# Patient Record
Sex: FEMALE | Race: WHITE | Employment: OTHER | URBAN - NONMETROPOLITAN AREA
[De-identification: names, ages, dates, MRNs, and addresses within clinical notes are randomized per-mention and may not be internally consistent; named-entity substitution may affect disease eponyms.]

---

## 2017-11-27 ENCOUNTER — HOSPITAL ENCOUNTER (OUTPATIENT)
Dept: BONE DENSITY | Age: 67
Discharge: HOME | End: 2017-11-27
Payer: MEDICARE

## 2017-11-27 ENCOUNTER — HOSPITAL ENCOUNTER (OUTPATIENT)
Dept: WOMENS IMAGING | Age: 67
Discharge: HOME | End: 2017-11-27
Payer: MEDICARE

## 2017-11-27 DIAGNOSIS — Z78.0 MENOPAUSE: ICD-10-CM

## 2017-11-27 DIAGNOSIS — Z12.31 VISIT FOR SCREENING MAMMOGRAM: ICD-10-CM

## 2017-11-27 PROCEDURE — 77080 DXA BONE DENSITY AXIAL: CPT

## 2017-11-27 PROCEDURE — G0202 SCR MAMMO BI INCL CAD: HCPCS

## 2019-03-26 ENCOUNTER — HOSPITAL ENCOUNTER (OUTPATIENT)
Dept: WOMENS IMAGING | Age: 69
Discharge: HOME | End: 2019-03-28
Payer: MEDICARE

## 2019-03-26 DIAGNOSIS — Z12.31 VISIT FOR SCREENING MAMMOGRAM: ICD-10-CM

## 2019-03-26 PROCEDURE — 77063 BREAST TOMOSYNTHESIS BI: CPT

## 2020-06-23 ENCOUNTER — OFFICE VISIT (OUTPATIENT)
Dept: PRIMARY CARE CLINIC | Age: 70
End: 2020-06-23
Payer: MEDICARE

## 2020-06-23 ENCOUNTER — HOSPITAL ENCOUNTER (OUTPATIENT)
Age: 70
Setting detail: SPECIMEN
Discharge: HOME | End: 2020-06-23
Payer: MEDICARE

## 2020-06-23 VITALS
DIASTOLIC BLOOD PRESSURE: 67 MMHG | WEIGHT: 170.8 LBS | RESPIRATION RATE: 20 BRPM | SYSTOLIC BLOOD PRESSURE: 118 MMHG | OXYGEN SATURATION: 98 % | BODY MASS INDEX: 25.22 KG/M2 | TEMPERATURE: 99.1 F | HEART RATE: 90 BPM

## 2020-06-23 LAB — S PYO AG THROAT QL: NORMAL

## 2020-06-23 PROCEDURE — 4040F PNEUMOC VAC/ADMIN/RCVD: CPT | Performed by: NURSE PRACTITIONER

## 2020-06-23 PROCEDURE — 3017F COLORECTAL CA SCREEN DOC REV: CPT | Performed by: NURSE PRACTITIONER

## 2020-06-23 PROCEDURE — G8417 CALC BMI ABV UP PARAM F/U: HCPCS | Performed by: NURSE PRACTITIONER

## 2020-06-23 PROCEDURE — 99213 OFFICE O/P EST LOW 20 MIN: CPT | Performed by: NURSE PRACTITIONER

## 2020-06-23 PROCEDURE — 1123F ACP DISCUSS/DSCN MKR DOCD: CPT | Performed by: NURSE PRACTITIONER

## 2020-06-23 PROCEDURE — G8427 DOCREV CUR MEDS BY ELIG CLIN: HCPCS | Performed by: NURSE PRACTITIONER

## 2020-06-23 PROCEDURE — G8399 PT W/DXA RESULTS DOCUMENT: HCPCS | Performed by: NURSE PRACTITIONER

## 2020-06-23 PROCEDURE — 87880 STREP A ASSAY W/OPTIC: CPT | Performed by: NURSE PRACTITIONER

## 2020-06-23 PROCEDURE — U0003 INFECTIOUS AGENT DETECTION BY NUCLEIC ACID (DNA OR RNA); SEVERE ACUTE RESPIRATORY SYNDROME CORONAVIRUS 2 (SARS-COV-2) (CORONAVIRUS DISEASE [COVID-19]), AMPLIFIED PROBE TECHNIQUE, MAKING USE OF HIGH THROUGHPUT TECHNOLOGIES AS DESCRIBED BY CMS-2020-01-R: HCPCS

## 2020-06-23 PROCEDURE — 1036F TOBACCO NON-USER: CPT | Performed by: NURSE PRACTITIONER

## 2020-06-23 PROCEDURE — 1090F PRES/ABSN URINE INCON ASSESS: CPT | Performed by: NURSE PRACTITIONER

## 2020-06-23 RX ORDER — ONDANSETRON 4 MG/1
4 TABLET, FILM COATED ORAL EVERY 12 HOURS PRN
Qty: 15 TABLET | Refills: 0 | Status: SHIPPED | OUTPATIENT
Start: 2020-06-23 | End: 2020-08-17

## 2020-06-23 ASSESSMENT — ENCOUNTER SYMPTOMS
VOMITING: 0
COUGH: 0
NAUSEA: 1
DIARRHEA: 0
WHEEZING: 0
EYE DISCHARGE: 0
SINUS PRESSURE: 0
PHOTOPHOBIA: 0
SHORTNESS OF BREATH: 0
CONSTIPATION: 0
ABDOMINAL PAIN: 0
SORE THROAT: 0
BLOOD IN STOOL: 0

## 2020-06-23 NOTE — PATIENT INSTRUCTIONS
virus. Their websites contain the most up-to-date information. Nicole Fling also learn what to do if you think you may have been exposed to the virus. · U.S. Centers for Disease Control and Prevention (CDC): The CDC provides updated news about the disease and travel advice. The website also tells you how to prevent the spread of infection. www.cdc.gov  · World Health Organization [facility]): WHO offers information about the virus outbreaks. WHO also has travel advice. www.who.int  Current as of: May 8, 2020               Content Version: 12.5  © 2006-2020 Healthwise, Incorporated. Care instructions adapted under license by [facility] ([facility]). If you have questions about a medical condition or this instruction, always ask your healthcare professional. [address] any warranty or liability for your use of this information.

## 2020-06-23 NOTE — PROGRESS NOTES
[facility] WALK-IN [facility]  [address] [facility] [address] [facility]  Dept: [phone]  Dept Fax: [phone]    Cuco Jackson is a 78 y.o. female who [facility]sto the [facility] today for her medical conditions/complaints as noted below. Cuco Jackson is c/o of Fever (this AM, 101 @ noon) and Fatigue (today, body aches)      HPI:     Juan Mcgill is here today for a walk in visit. When she woke up this morning she had muscle aches and fatigue so she took her temperature and it was 97. She took a nap and when she woke up her temperature was 101 around noon. She denies any cough, shortness of breath, sore throat, dysuria, diarrhea or swelling in her legs. She does admit to a mild frontal headache and body aches. Denies any photophobia or changes in vision. 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 She denies any sick contacts that she knows of. She denies any recent travel. See below for further details    Fever    This is a new problem. The current episode started today. The problem has been unchanged. The maximum temperature noted was 101 to 101.9 F (101). The temperature was taken using an oral thermometer. Associated symptoms include headaches (frontal headache), muscle aches, nausea and sleepiness. Pertinent negatives include no abdominal pain, chest pain, congestion, coughing, diarrhea, ear pain, rash, sore throat, urinary pain, vomiting or wheezing. Associated symptoms comments: Decreased appetite. She has tried nothing for the symptoms. Risk factors: no contaminated food, no contaminated water, no hx of cancer, no immunosuppression, no recent sickness, no recent travel and no sick contacts    Fatigue   This is a new problem. The current episode started today. The problem has been unchanged. Associated symptoms include fatigue, a fever, headaches (frontal headache), myalgias and nausea.  Pertinent negatives include no abdominal pain, chest pain, worsen or fail to improve. Orders Placed This Encounter   Medications    ondansetron (ZOFRAN) 4 MG tablet     Sig: Take 1 tablet by mouth every 12 hours as needed for Nausea or Vomiting     Dispense:  15 tablet     Refill:  0          All patient questions answered. Pt voiced understanding.       Electronically signed by SEVERO Lugo CNP on 6/23/2020 at 3:06 PM

## 2020-06-24 ENCOUNTER — CARE COORDINATION (OUTPATIENT)
Dept: CARE COORDINATION | Age: 70
End: 2020-06-24

## 2020-06-24 NOTE — CARE COORDINATION
medications related to discharge diagnosis     Patient/family/caregiver given information for GetWell Loop and agrees to enroll yes  Based on Loop alert triggers, patient will be contacted by nurse care manager for worsening symptoms. Pt will be further monitored by COVID Loop Team based on severity of symptoms and risk factors. Cecy Fox stated that she was feeling a little better this morning. She took some tylenol which helped her fever, headache, and aches. She had some nausea and said the zofran helped. She is now having diarrhea. 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 Encouraged fluids as tolerated and rest. Encouraged daily check ins on the Loop.

## 2020-06-25 ENCOUNTER — CARE COORDINATION (OUTPATIENT)
Dept: CARE COORDINATION | Age: 70
End: 2020-06-25

## 2020-06-25 NOTE — CARE COORDINATION
Yellow alert noted in Loop remote symptom monitoring program. Messaged patient to notify Marcin Sanders if symptoms have worsened since yesterday. Ashtyn Benton, Good Morning and thank you for checking in with us this morning. Please let us know if any of your symptoms are worse than yesterday, or if you would like to speak with a nurse. We are available 8-4 M-F, and 9-1 S/S. Thank You- Tena ESPINO    Patient responded back:  \"I still have diarrhea, that hasn't improved at all. My throat feels weird, like I have stuff in it and I am coughing a little bit this morning. Thank you, Sr. Frazier\"     Message sent to patient:   Be sure to drink plenty of fluids, Gatorade, pedialyte to prevent dehydration. Try drinking some hot tea, with honey and lemon to help your throat. You could also try gargling with warm salt water to help decrease the secretions in your throat.  Tena ESPINO

## 2020-06-26 ENCOUNTER — TELEPHONE (OUTPATIENT)
Dept: PRIMARY CARE CLINIC | Age: 70
End: 2020-06-26

## 2020-06-26 ENCOUNTER — CARE COORDINATION (OUTPATIENT)
Dept: CARE COORDINATION | Age: 70
End: 2020-06-26

## 2020-06-26 LAB — SARS-COV-2, NAA: NOT DETECTED

## 2020-06-26 NOTE — CARE COORDINATION
RN received message through loop from patient:  Naeem Galdamez, 11:09 AM   My test came back negative for covid and I am feeling much better. Do I need to be retested for the virus as someone suggested? Thank you for replying    RN response:  Lindsey Christian RN, 11:16 AM   Naeem Galdamez, I am happy to hear that you are feeling better. Unless you need some sort of documentation for an employer or someone, you should not need to be retested. If your symptoms would start to worsen again we would recommend following up with your primary care physician for recommendations.  Shantell Gonzalez RN

## 2020-06-26 NOTE — TELEPHONE ENCOUNTER
----- Message from SEVERO Ray CNP sent at 6/26/2020  8:10 AM EDT -----  Please notify patient that their COVID testing is negative meaning the virus was not detected.

## 2020-07-16 ENCOUNTER — HOSPITAL ENCOUNTER (OUTPATIENT)
Dept: LAB | Age: 70
Discharge: HOME | End: 2020-07-16
Payer: MEDICARE

## 2020-07-16 LAB
ALT SERPL-CCNC: 22 U/L (ref 5–33)
ANION GAP SERPL CALCULATED.3IONS-SCNC: 9 MMOL/L (ref 9–17)
AST SERPL-CCNC: 26 U/L
BUN BLDV-MCNC: 25 MG/DL (ref 8–23)
BUN/CREAT BLD: 22 (ref 9–20)
CALCIUM SERPL-MCNC: 9.8 MG/DL (ref 8.6–10.4)
CHLORIDE BLD-SCNC: 101 MMOL/L (ref 98–107)
CHOLESTEROL/HDL RATIO: 3.2
CHOLESTEROL: 171 MG/DL
CO2: 31 MMOL/L (ref 20–31)
CREAT SERPL-MCNC: 1.16 MG/DL (ref 0.5–0.9)
GFR AFRICAN AMERICAN: 56 ML/MIN
GFR NON-AFRICAN AMERICAN: 46 ML/MIN
GFR SERPL CREATININE-BSD FRML MDRD: ABNORMAL ML/MIN/{1.73_M2}
GFR SERPL CREATININE-BSD FRML MDRD: ABNORMAL ML/MIN/{1.73_M2}
GLUCOSE BLD-MCNC: 82 MG/DL (ref 70–99)
HCT VFR BLD CALC: 43.7 % (ref 36.3–47.1)
HDLC SERPL-MCNC: 53 MG/DL
HEMOGLOBIN: 14.2 G/DL (ref 11.9–15.1)
LDL CHOLESTEROL: 94 MG/DL (ref 0–130)
MCH RBC QN AUTO: 29.6 PG (ref 25.2–33.5)
MCHC RBC AUTO-ENTMCNC: 32.5 G/DL (ref 28.4–34.8)
MCV RBC AUTO: 91.2 FL (ref 82.6–102.9)
NRBC AUTOMATED: 0 PER 100 WBC
PDW BLD-RTO: 12.1 % (ref 11.8–14.4)
PLATELET # BLD: 241 K/UL (ref 138–453)
PMV BLD AUTO: 11.2 FL (ref 8.1–13.5)
POTASSIUM SERPL-SCNC: 3.8 MMOL/L (ref 3.7–5.3)
RBC # BLD: 4.79 M/UL (ref 3.95–5.11)
SODIUM BLD-SCNC: 141 MMOL/L (ref 135–144)
TRIGL SERPL-MCNC: 119 MG/DL
TSH SERPL DL<=0.05 MIU/L-ACNC: 0.7 MIU/L (ref 0.3–5)
VLDLC SERPL CALC-MCNC: NORMAL MG/DL (ref 1–30)
WBC # BLD: 6 K/UL (ref 3.5–11.3)

## 2020-07-16 PROCEDURE — 84460 ALANINE AMINO (ALT) (SGPT): CPT

## 2020-07-16 PROCEDURE — 84443 ASSAY THYROID STIM HORMONE: CPT

## 2020-07-16 PROCEDURE — 84450 TRANSFERASE (AST) (SGOT): CPT

## 2020-07-16 PROCEDURE — 80048 BASIC METABOLIC PNL TOTAL CA: CPT

## 2020-07-16 PROCEDURE — 80061 LIPID PANEL: CPT

## 2020-07-16 PROCEDURE — 36415 COLL VENOUS BLD VENIPUNCTURE: CPT

## 2020-07-16 PROCEDURE — 85027 COMPLETE CBC AUTOMATED: CPT

## 2020-08-12 ENCOUNTER — HOSPITAL ENCOUNTER (OUTPATIENT)
Dept: WOMENS IMAGING | Age: 70
Discharge: HOME | End: 2020-08-14
Payer: MEDICARE

## 2020-08-12 PROCEDURE — 77067 SCR MAMMO BI INCL CAD: CPT

## 2020-11-09 ENCOUNTER — HOSPITAL ENCOUNTER (OUTPATIENT)
Dept: LAB | Age: 70
Discharge: HOME | End: 2020-11-09
Payer: MEDICARE

## 2021-06-21 ENCOUNTER — APPOINTMENT (OUTPATIENT)
Dept: CT IMAGING | Age: 71
DRG: 343 | End: 2021-06-21
Payer: MEDICARE

## 2021-06-21 ENCOUNTER — HOSPITAL ENCOUNTER (INPATIENT)
Age: 71
LOS: 2 days | Discharge: HOME | DRG: 343 | End: 2021-06-23
Attending: EMERGENCY MEDICINE
Payer: MEDICARE

## 2021-06-21 DIAGNOSIS — E87.6 HYPOKALEMIA: ICD-10-CM

## 2021-06-21 DIAGNOSIS — Z90.49 S/P LAPAROSCOPIC APPENDECTOMY: ICD-10-CM

## 2021-06-21 DIAGNOSIS — K35.80 ACUTE APPENDICITIS, UNSPECIFIED ACUTE APPENDICITIS TYPE: Primary | ICD-10-CM

## 2021-06-21 DIAGNOSIS — R10.9 ABDOMINAL PAIN, UNSPECIFIED ABDOMINAL LOCATION: ICD-10-CM

## 2021-06-21 LAB
-: ABNORMAL
ABSOLUTE EOS #: <0.03 K/UL (ref 0–0.44)
ABSOLUTE IMMATURE GRANULOCYTE: 0.06 K/UL (ref 0–0.3)
ABSOLUTE LYMPH #: 0.97 K/UL (ref 1.1–3.7)
ABSOLUTE MONO #: 1.04 K/UL (ref 0.1–1.2)
ALBUMIN SERPL-MCNC: 4.3 G/DL (ref 3.5–5.2)
ALBUMIN/GLOBULIN RATIO: 1.8 (ref 1–2.5)
ALP BLD-CCNC: 75 U/L (ref 35–104)
ALT SERPL-CCNC: 25 U/L (ref 5–33)
AMORPHOUS: ABNORMAL
ANION GAP SERPL CALCULATED.3IONS-SCNC: 12 MMOL/L (ref 9–17)
AST SERPL-CCNC: 31 U/L
BACTERIA: ABNORMAL
BASOPHILS # BLD: 0 % (ref 0–2)
BASOPHILS ABSOLUTE: 0.06 K/UL (ref 0–0.2)
BILIRUB SERPL-MCNC: 1.22 MG/DL (ref 0.3–1.2)
BILIRUBIN URINE: NEGATIVE
BUN BLDV-MCNC: 24 MG/DL (ref 8–23)
BUN/CREAT BLD: 23 (ref 9–20)
CALCIUM SERPL-MCNC: 9 MG/DL (ref 8.6–10.4)
CASTS UA: ABNORMAL /LPF
CHLORIDE BLD-SCNC: 100 MMOL/L (ref 98–107)
CO2: 25 MMOL/L (ref 20–31)
COLOR: YELLOW
COMMENT UA: ABNORMAL
CREAT SERPL-MCNC: 1.03 MG/DL (ref 0.5–0.9)
CRYSTALS, UA: ABNORMAL /HPF
DIFFERENTIAL TYPE: ABNORMAL
EOSINOPHILS RELATIVE PERCENT: 0 % (ref 1–4)
EPITHELIAL CELLS UA: ABNORMAL /HPF (ref 0–25)
GFR AFRICAN AMERICAN: >60 ML/MIN
GFR NON-AFRICAN AMERICAN: 53 ML/MIN
GFR SERPL CREATININE-BSD FRML MDRD: ABNORMAL ML/MIN/{1.73_M2}
GFR SERPL CREATININE-BSD FRML MDRD: ABNORMAL ML/MIN/{1.73_M2}
GLUCOSE BLD-MCNC: 154 MG/DL (ref 70–99)
GLUCOSE URINE: NEGATIVE
HCT VFR BLD CALC: 42.4 % (ref 36.3–47.1)
HEMOGLOBIN: 14.4 G/DL (ref 11.9–15.1)
IMMATURE GRANULOCYTES: 0 %
KETONES, URINE: NEGATIVE
LACTIC ACID, WHOLE BLOOD: NORMAL MMOL/L (ref 0.7–2.1)
LACTIC ACID: 1.1 MMOL/L (ref 0.5–2.2)
LEUKOCYTE ESTERASE, URINE: ABNORMAL
LIPASE: 42 U/L (ref 13–60)
LYMPHOCYTES # BLD: 6 % (ref 24–43)
MAGNESIUM: 2.2 MG/DL (ref 1.6–2.6)
MCH RBC QN AUTO: 29.2 PG (ref 25.2–33.5)
MCHC RBC AUTO-ENTMCNC: 34 G/DL (ref 28.4–34.8)
MCV RBC AUTO: 86 FL (ref 82.6–102.9)
MONOCYTES # BLD: 7 % (ref 3–12)
MUCUS: ABNORMAL
NITRITE, URINE: NEGATIVE
NRBC AUTOMATED: 0 PER 100 WBC
OTHER OBSERVATIONS UA: ABNORMAL
PDW BLD-RTO: 12.5 % (ref 11.8–14.4)
PH UA: 5.5 (ref 5–9)
PLATELET # BLD: 208 K/UL (ref 138–453)
PLATELET ESTIMATE: ABNORMAL
PMV BLD AUTO: 10.7 FL (ref 8.1–13.5)
POTASSIUM SERPL-SCNC: 2.9 MMOL/L (ref 3.7–5.3)
PROTEIN UA: NEGATIVE
RBC # BLD: 4.93 M/UL (ref 3.95–5.11)
RBC # BLD: ABNORMAL 10*6/UL
RBC UA: ABNORMAL /HPF (ref 0–2)
RENAL EPITHELIAL, UA: ABNORMAL /HPF
SEG NEUTROPHILS: 87 % (ref 36–65)
SEGMENTED NEUTROPHILS ABSOLUTE COUNT: 13.01 K/UL (ref 1.5–8.1)
SODIUM BLD-SCNC: 137 MMOL/L (ref 135–144)
SPECIFIC GRAVITY UA: 1.01 (ref 1.01–1.02)
TOTAL PROTEIN: 6.7 G/DL (ref 6.4–8.3)
TRICHOMONAS: ABNORMAL
TURBIDITY: CLEAR
URINE HGB: NEGATIVE
UROBILINOGEN, URINE: NORMAL
WBC # BLD: 15.2 K/UL (ref 3.5–11.3)
WBC # BLD: ABNORMAL 10*3/UL
WBC UA: ABNORMAL /HPF (ref 0–5)
YEAST: ABNORMAL

## 2021-06-21 PROCEDURE — 99283 EMERGENCY DEPT VISIT LOW MDM: CPT

## 2021-06-21 PROCEDURE — 83605 ASSAY OF LACTIC ACID: CPT

## 2021-06-21 PROCEDURE — 81001 URINALYSIS AUTO W/SCOPE: CPT

## 2021-06-21 PROCEDURE — 2580000003 HC RX 258: Performed by: PHYSICIAN ASSISTANT

## 2021-06-21 PROCEDURE — 6360000002 HC RX W HCPCS: Performed by: PHYSICIAN ASSISTANT

## 2021-06-21 PROCEDURE — G1010 CDSM STANSON: HCPCS

## 2021-06-21 PROCEDURE — 1200000000 HC SEMI PRIVATE

## 2021-06-21 PROCEDURE — 36415 COLL VENOUS BLD VENIPUNCTURE: CPT

## 2021-06-21 PROCEDURE — 96365 THER/PROPH/DIAG IV INF INIT: CPT

## 2021-06-21 PROCEDURE — 85025 COMPLETE CBC W/AUTO DIFF WBC: CPT

## 2021-06-21 PROCEDURE — 83690 ASSAY OF LIPASE: CPT

## 2021-06-21 PROCEDURE — 80053 COMPREHEN METABOLIC PANEL: CPT

## 2021-06-21 PROCEDURE — 87040 BLOOD CULTURE FOR BACTERIA: CPT

## 2021-06-21 PROCEDURE — 6360000004 HC RX CONTRAST MEDICATION: Performed by: PHYSICIAN ASSISTANT

## 2021-06-21 PROCEDURE — 83735 ASSAY OF MAGNESIUM: CPT

## 2021-06-21 RX ORDER — SODIUM CHLORIDE 0.9 % (FLUSH) 0.9 %
10 SYRINGE (ML) INJECTION EVERY 12 HOURS SCHEDULED
Status: DISCONTINUED | OUTPATIENT
Start: 2021-06-21 | End: 2021-06-23 | Stop reason: HOSPADM

## 2021-06-21 RX ORDER — ACETAMINOPHEN 650 MG/1
650 SUPPOSITORY RECTAL EVERY 6 HOURS PRN
Status: DISCONTINUED | OUTPATIENT
Start: 2021-06-21 | End: 2021-06-23 | Stop reason: HOSPADM

## 2021-06-21 RX ORDER — ACETAMINOPHEN 325 MG/1
650 TABLET ORAL EVERY 6 HOURS PRN
Status: DISCONTINUED | OUTPATIENT
Start: 2021-06-21 | End: 2021-06-23 | Stop reason: HOSPADM

## 2021-06-21 RX ORDER — ONDANSETRON 4 MG/1
4 TABLET, ORALLY DISINTEGRATING ORAL EVERY 8 HOURS PRN
Status: DISCONTINUED | OUTPATIENT
Start: 2021-06-21 | End: 2021-06-23 | Stop reason: HOSPADM

## 2021-06-21 RX ORDER — POLYETHYLENE GLYCOL 3350 17 G/17G
17 POWDER, FOR SOLUTION ORAL DAILY PRN
Status: DISCONTINUED | OUTPATIENT
Start: 2021-06-21 | End: 2021-06-23 | Stop reason: HOSPADM

## 2021-06-21 RX ORDER — ONDANSETRON 2 MG/ML
4 INJECTION INTRAMUSCULAR; INTRAVENOUS EVERY 6 HOURS PRN
Status: DISCONTINUED | OUTPATIENT
Start: 2021-06-21 | End: 2021-06-23 | Stop reason: HOSPADM

## 2021-06-21 RX ORDER — SODIUM CHLORIDE 9 MG/ML
INJECTION, SOLUTION INTRAVENOUS CONTINUOUS
Status: DISCONTINUED | OUTPATIENT
Start: 2021-06-21 | End: 2021-06-23

## 2021-06-21 RX ORDER — SODIUM CHLORIDE 9 MG/ML
25 INJECTION, SOLUTION INTRAVENOUS PRN
Status: DISCONTINUED | OUTPATIENT
Start: 2021-06-21 | End: 2021-06-23 | Stop reason: HOSPADM

## 2021-06-21 RX ORDER — POTASSIUM CHLORIDE 7.45 MG/ML
10 INJECTION INTRAVENOUS PRN
Status: DISCONTINUED | OUTPATIENT
Start: 2021-06-21 | End: 2021-06-23 | Stop reason: HOSPADM

## 2021-06-21 RX ORDER — SODIUM CHLORIDE 0.9 % (FLUSH) 0.9 %
10 SYRINGE (ML) INJECTION PRN
Status: DISCONTINUED | OUTPATIENT
Start: 2021-06-21 | End: 2021-06-23 | Stop reason: HOSPADM

## 2021-06-21 RX ORDER — POTASSIUM CHLORIDE 7.45 MG/ML
20 INJECTION INTRAVENOUS ONCE
Status: DISCONTINUED | OUTPATIENT
Start: 2021-06-21 | End: 2021-06-21

## 2021-06-21 RX ORDER — SODIUM CHLORIDE, SODIUM LACTATE, POTASSIUM CHLORIDE, CALCIUM CHLORIDE 600; 310; 30; 20 MG/100ML; MG/100ML; MG/100ML; MG/100ML
INJECTION, SOLUTION INTRAVENOUS ONCE
Status: COMPLETED | OUTPATIENT
Start: 2021-06-21 | End: 2021-06-21

## 2021-06-21 RX ADMIN — SODIUM CHLORIDE, POTASSIUM CHLORIDE, SODIUM LACTATE AND CALCIUM CHLORIDE: 600; 310; 30; 20 INJECTION, SOLUTION INTRAVENOUS at 21:19

## 2021-06-21 RX ADMIN — IOPAMIDOL 75 ML: 755 INJECTION, SOLUTION INTRAVENOUS at 22:08

## 2021-06-21 RX ADMIN — PIPERACILLIN AND TAZOBACTAM 3375 MG: 3; .375 INJECTION, POWDER, FOR SOLUTION INTRAVENOUS at 21:57

## 2021-06-21 ASSESSMENT — ENCOUNTER SYMPTOMS
VOMITING: 0
NAUSEA: 0
RHINORRHEA: 0
CONSTIPATION: 0
COUGH: 0
EYE DISCHARGE: 0
WHEEZING: 0
CHEST TIGHTNESS: 0
BLOOD IN STOOL: 0
SORE THROAT: 0
BACK PAIN: 0
DIARRHEA: 0
SHORTNESS OF BREATH: 0
NAUSEA: 1
ABDOMINAL PAIN: 1
EYE REDNESS: 0

## 2021-06-21 ASSESSMENT — PAIN SCALES - GENERAL: PAINLEVEL_OUTOF10: 5

## 2021-06-21 ASSESSMENT — PAIN DESCRIPTION - LOCATION: LOCATION: ABDOMEN

## 2021-06-21 ASSESSMENT — PAIN DESCRIPTION - PAIN TYPE: TYPE: ACUTE PAIN

## 2021-06-21 ASSESSMENT — PAIN DESCRIPTION - FREQUENCY: FREQUENCY: CONTINUOUS

## 2021-06-21 ASSESSMENT — PAIN DESCRIPTION - DESCRIPTORS: DESCRIPTORS: ACHING

## 2021-06-21 ASSESSMENT — PAIN DESCRIPTION - ORIENTATION: ORIENTATION: RIGHT;LOWER

## 2021-06-22 ENCOUNTER — ANESTHESIA (OUTPATIENT)
Dept: OPERATING ROOM | Age: 71
DRG: 343 | End: 2021-06-22
Payer: MEDICARE

## 2021-06-22 ENCOUNTER — ANESTHESIA EVENT (OUTPATIENT)
Dept: OPERATING ROOM | Age: 71
DRG: 343 | End: 2021-06-22
Payer: MEDICARE

## 2021-06-22 VITALS — OXYGEN SATURATION: 100 % | DIASTOLIC BLOOD PRESSURE: 71 MMHG | SYSTOLIC BLOOD PRESSURE: 217 MMHG

## 2021-06-22 PROBLEM — E87.6 HYPOKALEMIA: Status: ACTIVE | Noted: 2021-06-22

## 2021-06-22 LAB
ABSOLUTE EOS #: <0.03 K/UL (ref 0–0.44)
ABSOLUTE IMMATURE GRANULOCYTE: 0.07 K/UL (ref 0–0.3)
ABSOLUTE LYMPH #: 1.64 K/UL (ref 1.1–3.7)
ABSOLUTE MONO #: 1.17 K/UL (ref 0.1–1.2)
ANION GAP SERPL CALCULATED.3IONS-SCNC: 9 MMOL/L (ref 9–17)
BASOPHILS # BLD: 0 % (ref 0–2)
BASOPHILS ABSOLUTE: 0.05 K/UL (ref 0–0.2)
BUN BLDV-MCNC: 21 MG/DL (ref 8–23)
BUN/CREAT BLD: 25 (ref 9–20)
CALCIUM SERPL-MCNC: 8.9 MG/DL (ref 8.6–10.4)
CHLORIDE BLD-SCNC: 104 MMOL/L (ref 98–107)
CO2: 28 MMOL/L (ref 20–31)
CREAT SERPL-MCNC: 0.85 MG/DL (ref 0.5–0.9)
DIFFERENTIAL TYPE: ABNORMAL
EKG ATRIAL RATE: 104 BPM
EKG P AXIS: 27 DEGREES
EKG P-R INTERVAL: 248 MS
EKG Q-T INTERVAL: 396 MS
EKG QRS DURATION: 82 MS
EKG QTC CALCULATION (BAZETT): 520 MS
EKG R AXIS: -30 DEGREES
EKG T AXIS: 20 DEGREES
EKG VENTRICULAR RATE: 104 BPM
EOSINOPHILS RELATIVE PERCENT: 0 % (ref 1–4)
GFR AFRICAN AMERICAN: >60 ML/MIN
GFR NON-AFRICAN AMERICAN: >60 ML/MIN
GFR SERPL CREATININE-BSD FRML MDRD: ABNORMAL ML/MIN/{1.73_M2}
GFR SERPL CREATININE-BSD FRML MDRD: ABNORMAL ML/MIN/{1.73_M2}
GLUCOSE BLD-MCNC: 126 MG/DL (ref 70–99)
HCT VFR BLD CALC: 40.9 % (ref 36.3–47.1)
HEMOGLOBIN: 13.7 G/DL (ref 11.9–15.1)
IMMATURE GRANULOCYTES: 1 %
LYMPHOCYTES # BLD: 11 % (ref 24–43)
MAGNESIUM: 2.2 MG/DL (ref 1.6–2.6)
MCH RBC QN AUTO: 28.7 PG (ref 25.2–33.5)
MCHC RBC AUTO-ENTMCNC: 33.5 G/DL (ref 28.4–34.8)
MCV RBC AUTO: 85.6 FL (ref 82.6–102.9)
MONOCYTES # BLD: 8 % (ref 3–12)
NRBC AUTOMATED: 0 PER 100 WBC
PDW BLD-RTO: 12.5 % (ref 11.8–14.4)
PLATELET # BLD: 195 K/UL (ref 138–453)
PLATELET ESTIMATE: ABNORMAL
PMV BLD AUTO: 10.8 FL (ref 8.1–13.5)
POTASSIUM SERPL-SCNC: 3.3 MMOL/L (ref 3.7–5.3)
POTASSIUM SERPL-SCNC: 3.8 MMOL/L (ref 3.7–5.3)
RBC # BLD: 4.78 M/UL (ref 3.95–5.11)
RBC # BLD: ABNORMAL 10*6/UL
SEG NEUTROPHILS: 80 % (ref 36–65)
SEGMENTED NEUTROPHILS ABSOLUTE COUNT: 12.39 K/UL (ref 1.5–8.1)
SODIUM BLD-SCNC: 141 MMOL/L (ref 135–144)
WBC # BLD: 15.3 K/UL (ref 3.5–11.3)
WBC # BLD: ABNORMAL 10*3/UL

## 2021-06-22 PROCEDURE — 2580000003 HC RX 258: Performed by: SURGERY

## 2021-06-22 PROCEDURE — 93010 ELECTROCARDIOGRAM REPORT: CPT | Performed by: INTERNAL MEDICINE

## 2021-06-22 PROCEDURE — 94761 N-INVAS EAR/PLS OXIMETRY MLT: CPT

## 2021-06-22 PROCEDURE — 83735 ASSAY OF MAGNESIUM: CPT

## 2021-06-22 PROCEDURE — 7100000000 HC PACU RECOVERY - FIRST 15 MIN: Performed by: SURGERY

## 2021-06-22 PROCEDURE — 80048 BASIC METABOLIC PNL TOTAL CA: CPT

## 2021-06-22 PROCEDURE — 6360000002 HC RX W HCPCS: Performed by: NURSE ANESTHETIST, CERTIFIED REGISTERED

## 2021-06-22 PROCEDURE — 2580000003 HC RX 258: Performed by: NURSE PRACTITIONER

## 2021-06-22 PROCEDURE — 2709999900 HC NON-CHARGEABLE SUPPLY: Performed by: SURGERY

## 2021-06-22 PROCEDURE — 0DTJ4ZZ RESECTION OF APPENDIX, PERCUTANEOUS ENDOSCOPIC APPROACH: ICD-10-PCS | Performed by: SURGERY

## 2021-06-22 PROCEDURE — 6370000000 HC RX 637 (ALT 250 FOR IP): Performed by: NURSE PRACTITIONER

## 2021-06-22 PROCEDURE — 2500000003 HC RX 250 WO HCPCS: Performed by: NURSE ANESTHETIST, CERTIFIED REGISTERED

## 2021-06-22 PROCEDURE — 84132 ASSAY OF SERUM POTASSIUM: CPT

## 2021-06-22 PROCEDURE — 6360000002 HC RX W HCPCS: Performed by: NURSE PRACTITIONER

## 2021-06-22 PROCEDURE — 6360000002 HC RX W HCPCS: Performed by: SURGERY

## 2021-06-22 PROCEDURE — 6360000002 HC RX W HCPCS: Performed by: PHYSICIAN ASSISTANT

## 2021-06-22 PROCEDURE — 97161 PT EVAL LOW COMPLEX 20 MIN: CPT

## 2021-06-22 PROCEDURE — 3700000001 HC ADD 15 MINUTES (ANESTHESIA): Performed by: SURGERY

## 2021-06-22 PROCEDURE — 2580000003 HC RX 258: Performed by: PHYSICIAN ASSISTANT

## 2021-06-22 PROCEDURE — 2580000003 HC RX 258: Performed by: NURSE ANESTHETIST, CERTIFIED REGISTERED

## 2021-06-22 PROCEDURE — 36415 COLL VENOUS BLD VENIPUNCTURE: CPT

## 2021-06-22 PROCEDURE — 6370000000 HC RX 637 (ALT 250 FOR IP): Performed by: PHYSICIAN ASSISTANT

## 2021-06-22 PROCEDURE — 88304 TISSUE EXAM BY PATHOLOGIST: CPT

## 2021-06-22 PROCEDURE — 85025 COMPLETE CBC W/AUTO DIFF WBC: CPT

## 2021-06-22 PROCEDURE — 3600000004 HC SURGERY LEVEL 4 BASE: Performed by: SURGERY

## 2021-06-22 PROCEDURE — 6370000000 HC RX 637 (ALT 250 FOR IP): Performed by: SURGERY

## 2021-06-22 PROCEDURE — 99221 1ST HOSP IP/OBS SF/LOW 40: CPT | Performed by: SURGERY

## 2021-06-22 PROCEDURE — 7100000001 HC PACU RECOVERY - ADDTL 15 MIN: Performed by: SURGERY

## 2021-06-22 PROCEDURE — 64488 TAP BLOCK BI INJECTION: CPT | Performed by: NURSE ANESTHETIST, CERTIFIED REGISTERED

## 2021-06-22 PROCEDURE — 3700000000 HC ANESTHESIA ATTENDED CARE: Performed by: SURGERY

## 2021-06-22 PROCEDURE — 93005 ELECTROCARDIOGRAM TRACING: CPT | Performed by: PHYSICIAN ASSISTANT

## 2021-06-22 PROCEDURE — 1200000000 HC SEMI PRIVATE

## 2021-06-22 PROCEDURE — 97530 THERAPEUTIC ACTIVITIES: CPT

## 2021-06-22 PROCEDURE — 2720000010 HC SURG SUPPLY STERILE: Performed by: SURGERY

## 2021-06-22 PROCEDURE — 3600000014 HC SURGERY LEVEL 4 ADDTL 15MIN: Performed by: SURGERY

## 2021-06-22 RX ORDER — FENTANYL CITRATE 50 UG/ML
50 INJECTION, SOLUTION INTRAMUSCULAR; INTRAVENOUS EVERY 5 MIN PRN
Status: DISCONTINUED | OUTPATIENT
Start: 2021-06-22 | End: 2021-06-22 | Stop reason: HOSPADM

## 2021-06-22 RX ORDER — FENTANYL CITRATE 50 UG/ML
INJECTION, SOLUTION INTRAMUSCULAR; INTRAVENOUS PRN
Status: DISCONTINUED | OUTPATIENT
Start: 2021-06-22 | End: 2021-06-22 | Stop reason: SDUPTHER

## 2021-06-22 RX ORDER — PROPOFOL 10 MG/ML
INJECTION, EMULSION INTRAVENOUS PRN
Status: DISCONTINUED | OUTPATIENT
Start: 2021-06-22 | End: 2021-06-22 | Stop reason: SDUPTHER

## 2021-06-22 RX ORDER — DEXAMETHASONE SODIUM PHOSPHATE 10 MG/ML
INJECTION, SOLUTION INTRAMUSCULAR; INTRAVENOUS PRN
Status: DISCONTINUED | OUTPATIENT
Start: 2021-06-22 | End: 2021-06-22 | Stop reason: SDUPTHER

## 2021-06-22 RX ORDER — PROCHLORPERAZINE EDISYLATE 5 MG/ML
5 INJECTION INTRAMUSCULAR; INTRAVENOUS
Status: DISCONTINUED | OUTPATIENT
Start: 2021-06-22 | End: 2021-06-22 | Stop reason: HOSPADM

## 2021-06-22 RX ORDER — METOCLOPRAMIDE HYDROCHLORIDE 5 MG/ML
10 INJECTION INTRAMUSCULAR; INTRAVENOUS
Status: DISCONTINUED | OUTPATIENT
Start: 2021-06-22 | End: 2021-06-22 | Stop reason: HOSPADM

## 2021-06-22 RX ORDER — GINSENG 100 MG
CAPSULE ORAL PRN
Status: DISCONTINUED | OUTPATIENT
Start: 2021-06-22 | End: 2021-06-22 | Stop reason: HOSPADM

## 2021-06-22 RX ORDER — LIDOCAINE HYDROCHLORIDE 20 MG/ML
INJECTION, SOLUTION EPIDURAL; INFILTRATION; INTRACAUDAL; PERINEURAL PRN
Status: DISCONTINUED | OUTPATIENT
Start: 2021-06-22 | End: 2021-06-22 | Stop reason: SDUPTHER

## 2021-06-22 RX ORDER — GLYCOPYRROLATE 1 MG/5 ML
SYRINGE (ML) INTRAVENOUS PRN
Status: DISCONTINUED | OUTPATIENT
Start: 2021-06-22 | End: 2021-06-22 | Stop reason: SDUPTHER

## 2021-06-22 RX ORDER — PHENYLEPHRINE HYDROCHLORIDE 10 MG/ML
INJECTION INTRAVENOUS PRN
Status: DISCONTINUED | OUTPATIENT
Start: 2021-06-22 | End: 2021-06-22 | Stop reason: SDUPTHER

## 2021-06-22 RX ORDER — TRIAMTERENE AND HYDROCHLOROTHIAZIDE 37.5; 25 MG/1; MG/1
1 TABLET ORAL EVERY MORNING
Status: DISCONTINUED | OUTPATIENT
Start: 2021-06-22 | End: 2021-06-23 | Stop reason: HOSPADM

## 2021-06-22 RX ORDER — DEXAMETHASONE SODIUM PHOSPHATE 4 MG/ML
INJECTION, SOLUTION INTRA-ARTICULAR; INTRALESIONAL; INTRAMUSCULAR; INTRAVENOUS; SOFT TISSUE PRN
Status: DISCONTINUED | OUTPATIENT
Start: 2021-06-22 | End: 2021-06-22 | Stop reason: SDUPTHER

## 2021-06-22 RX ORDER — ZONISAMIDE 25 MG/1
25 CAPSULE ORAL DAILY
Status: DISCONTINUED | OUTPATIENT
Start: 2021-06-22 | End: 2021-06-23 | Stop reason: HOSPADM

## 2021-06-22 RX ORDER — SODIUM CHLORIDE, SODIUM LACTATE, POTASSIUM CHLORIDE, CALCIUM CHLORIDE 600; 310; 30; 20 MG/100ML; MG/100ML; MG/100ML; MG/100ML
INJECTION, SOLUTION INTRAVENOUS CONTINUOUS PRN
Status: DISCONTINUED | OUTPATIENT
Start: 2021-06-22 | End: 2021-06-22 | Stop reason: SDUPTHER

## 2021-06-22 RX ORDER — NEOSTIGMINE METHYLSULFATE 1 MG/ML
INJECTION, SOLUTION INTRAVENOUS PRN
Status: DISCONTINUED | OUTPATIENT
Start: 2021-06-22 | End: 2021-06-22 | Stop reason: SDUPTHER

## 2021-06-22 RX ORDER — DIPHENHYDRAMINE HYDROCHLORIDE 50 MG/ML
INJECTION INTRAMUSCULAR; INTRAVENOUS PRN
Status: DISCONTINUED | OUTPATIENT
Start: 2021-06-22 | End: 2021-06-22 | Stop reason: SDUPTHER

## 2021-06-22 RX ORDER — ROPIVACAINE HYDROCHLORIDE 5 MG/ML
INJECTION, SOLUTION EPIDURAL; INFILTRATION; PERINEURAL PRN
Status: DISCONTINUED | OUTPATIENT
Start: 2021-06-22 | End: 2021-06-22 | Stop reason: SDUPTHER

## 2021-06-22 RX ORDER — HYDROCODONE BITARTRATE AND ACETAMINOPHEN 5; 325 MG/1; MG/1
1 TABLET ORAL EVERY 6 HOURS PRN
Qty: 12 TABLET | Refills: 0 | Status: SHIPPED | OUTPATIENT
Start: 2021-06-22 | End: 2021-06-25

## 2021-06-22 RX ORDER — POTASSIUM CHLORIDE 20 MEQ/1
40 TABLET, EXTENDED RELEASE ORAL ONCE
Status: COMPLETED | OUTPATIENT
Start: 2021-06-22 | End: 2021-06-22

## 2021-06-22 RX ORDER — LEVOTHYROXINE SODIUM 0.07 MG/1
75 TABLET ORAL
Status: DISCONTINUED | OUTPATIENT
Start: 2021-06-22 | End: 2021-06-23 | Stop reason: HOSPADM

## 2021-06-22 RX ORDER — ROCURONIUM BROMIDE 10 MG/ML
INJECTION, SOLUTION INTRAVENOUS PRN
Status: DISCONTINUED | OUTPATIENT
Start: 2021-06-22 | End: 2021-06-22 | Stop reason: SDUPTHER

## 2021-06-22 RX ORDER — PREGABALIN 75 MG/1
75 CAPSULE ORAL 2 TIMES DAILY
Status: DISCONTINUED | OUTPATIENT
Start: 2021-06-22 | End: 2021-06-23 | Stop reason: HOSPADM

## 2021-06-22 RX ORDER — FENTANYL CITRATE 50 UG/ML
25 INJECTION, SOLUTION INTRAMUSCULAR; INTRAVENOUS EVERY 5 MIN PRN
Status: DISCONTINUED | OUTPATIENT
Start: 2021-06-22 | End: 2021-06-22 | Stop reason: HOSPADM

## 2021-06-22 RX ORDER — ONDANSETRON 2 MG/ML
INJECTION INTRAMUSCULAR; INTRAVENOUS PRN
Status: DISCONTINUED | OUTPATIENT
Start: 2021-06-22 | End: 2021-06-22 | Stop reason: SDUPTHER

## 2021-06-22 RX ORDER — ESMOLOL HYDROCHLORIDE 10 MG/ML
INJECTION INTRAVENOUS PRN
Status: DISCONTINUED | OUTPATIENT
Start: 2021-06-22 | End: 2021-06-22 | Stop reason: SDUPTHER

## 2021-06-22 RX ORDER — KETOROLAC TROMETHAMINE 15 MG/ML
15 INJECTION, SOLUTION INTRAMUSCULAR; INTRAVENOUS ONCE
Status: COMPLETED | OUTPATIENT
Start: 2021-06-22 | End: 2021-06-22

## 2021-06-22 RX ORDER — SODIUM CHLORIDE 9 MG/ML
INJECTION INTRAVENOUS PRN
Status: DISCONTINUED | OUTPATIENT
Start: 2021-06-22 | End: 2021-06-22 | Stop reason: SDUPTHER

## 2021-06-22 RX ADMIN — DEXAMETHASONE SODIUM PHOSPHATE 8 MG: 4 INJECTION, SOLUTION INTRAMUSCULAR; INTRAVENOUS at 08:15

## 2021-06-22 RX ADMIN — FENTANYL CITRATE 100 MCG: 50 INJECTION, SOLUTION INTRAMUSCULAR; INTRAVENOUS at 08:00

## 2021-06-22 RX ADMIN — DEXAMETHASONE SODIUM PHOSPHATE 10 MG: 10 INJECTION, SOLUTION INTRAMUSCULAR; INTRAVENOUS at 08:12

## 2021-06-22 RX ADMIN — SODIUM CHLORIDE 40 ML: 9 INJECTION, SOLUTION INTRAMUSCULAR; INTRAVENOUS; SUBCUTANEOUS at 08:12

## 2021-06-22 RX ADMIN — ESMOLOL HYDROCHLORIDE 30 MG: 10 INJECTION, SOLUTION INTRAVENOUS at 08:05

## 2021-06-22 RX ADMIN — ACETAMINOPHEN 650 MG: 325 TABLET ORAL at 15:20

## 2021-06-22 RX ADMIN — POTASSIUM CHLORIDE 10 MEQ: 7.46 INJECTION, SOLUTION INTRAVENOUS at 02:39

## 2021-06-22 RX ADMIN — PIPERACILLIN AND TAZOBACTAM 3375 MG: 3; .375 INJECTION, POWDER, FOR SOLUTION INTRAVENOUS at 21:04

## 2021-06-22 RX ADMIN — POTASSIUM CHLORIDE 10 MEQ: 7.46 INJECTION, SOLUTION INTRAVENOUS at 13:20

## 2021-06-22 RX ADMIN — SODIUM CHLORIDE: 9 INJECTION, SOLUTION INTRAVENOUS at 10:23

## 2021-06-22 RX ADMIN — POTASSIUM CHLORIDE 10 MEQ: 7.46 INJECTION, SOLUTION INTRAVENOUS at 05:02

## 2021-06-22 RX ADMIN — DIPHENHYDRAMINE HYDROCHLORIDE 12.5 MG: 50 INJECTION INTRAMUSCULAR; INTRAVENOUS at 08:18

## 2021-06-22 RX ADMIN — SODIUM CHLORIDE: 9 INJECTION, SOLUTION INTRAVENOUS at 00:55

## 2021-06-22 RX ADMIN — ACETAMINOPHEN 650 MG: 325 TABLET ORAL at 06:40

## 2021-06-22 RX ADMIN — PHENYLEPHRINE HYDROCHLORIDE 50 MCG: 10 INJECTION INTRAVENOUS at 08:15

## 2021-06-22 RX ADMIN — Medication 0.2 MG: at 08:18

## 2021-06-22 RX ADMIN — SODIUM CHLORIDE, PRESERVATIVE FREE 10 ML: 5 INJECTION INTRAVENOUS at 21:04

## 2021-06-22 RX ADMIN — Medication 0.4 MG: at 09:03

## 2021-06-22 RX ADMIN — POTASSIUM CHLORIDE 10 MEQ: 7.46 INJECTION, SOLUTION INTRAVENOUS at 00:59

## 2021-06-22 RX ADMIN — Medication 3 MG: at 09:03

## 2021-06-22 RX ADMIN — PROPOFOL 100 MG: 10 INJECTION, EMULSION INTRAVENOUS at 08:05

## 2021-06-22 RX ADMIN — ONDANSETRON 4 MG: 2 INJECTION INTRAMUSCULAR; INTRAVENOUS at 08:15

## 2021-06-22 RX ADMIN — ROPIVACAINE HYDROCHLORIDE 49 ML: 5 INJECTION, SOLUTION EPIDURAL; INFILTRATION; PERINEURAL at 08:12

## 2021-06-22 RX ADMIN — KETOROLAC TROMETHAMINE 15 MG: 15 INJECTION, SOLUTION INTRAMUSCULAR; INTRAVENOUS at 21:03

## 2021-06-22 RX ADMIN — POTASSIUM CHLORIDE 10 MEQ: 7.46 INJECTION, SOLUTION INTRAVENOUS at 11:56

## 2021-06-22 RX ADMIN — PREGABALIN 75 MG: 75 CAPSULE ORAL at 21:03

## 2021-06-22 RX ADMIN — POTASSIUM CHLORIDE 10 MEQ: 7.46 INJECTION, SOLUTION INTRAVENOUS at 10:36

## 2021-06-22 RX ADMIN — SODIUM CHLORIDE, POTASSIUM CHLORIDE, SODIUM LACTATE AND CALCIUM CHLORIDE: 600; 310; 30; 20 INJECTION, SOLUTION INTRAVENOUS at 08:30

## 2021-06-22 RX ADMIN — LIDOCAINE HYDROCHLORIDE 2.5 ML: 20 INJECTION, SOLUTION EPIDURAL; INFILTRATION; INTRACAUDAL; PERINEURAL at 08:05

## 2021-06-22 RX ADMIN — POTASSIUM CHLORIDE 40 MEQ: 1500 TABLET, EXTENDED RELEASE ORAL at 19:19

## 2021-06-22 RX ADMIN — PIPERACILLIN SODIUM,TAZOBACTAM SODIUM 3375 MG: 3; .375 INJECTION, POWDER, FOR SOLUTION INTRAVENOUS at 08:12

## 2021-06-22 RX ADMIN — ONDANSETRON 4 MG: 2 INJECTION INTRAMUSCULAR; INTRAVENOUS at 09:07

## 2021-06-22 RX ADMIN — SODIUM CHLORIDE: 9 INJECTION, SOLUTION INTRAVENOUS at 23:56

## 2021-06-22 RX ADMIN — ROCURONIUM BROMIDE 50 MG: 10 INJECTION, SOLUTION INTRAVENOUS at 08:06

## 2021-06-22 RX ADMIN — PHENYLEPHRINE HYDROCHLORIDE 50 MCG: 10 INJECTION INTRAVENOUS at 08:14

## 2021-06-22 RX ADMIN — PIPERACILLIN AND TAZOBACTAM 3375 MG: 3; .375 INJECTION, POWDER, FOR SOLUTION INTRAVENOUS at 15:35

## 2021-06-22 ASSESSMENT — PAIN - FUNCTIONAL ASSESSMENT
PAIN_FUNCTIONAL_ASSESSMENT: ACTIVITIES ARE NOT PREVENTED

## 2021-06-22 ASSESSMENT — PAIN DESCRIPTION - ORIENTATION
ORIENTATION: RIGHT;LOWER

## 2021-06-22 ASSESSMENT — PAIN SCALES - GENERAL
PAINLEVEL_OUTOF10: 4
PAINLEVEL_OUTOF10: 4
PAINLEVEL_OUTOF10: 0
PAINLEVEL_OUTOF10: 0
PAINLEVEL_OUTOF10: 3
PAINLEVEL_OUTOF10: 4
PAINLEVEL_OUTOF10: 5
PAINLEVEL_OUTOF10: 3
PAINLEVEL_OUTOF10: 0
PAINLEVEL_OUTOF10: 4
PAINLEVEL_OUTOF10: 3
PAINLEVEL_OUTOF10: 5
PAINLEVEL_OUTOF10: 5
PAINLEVEL_OUTOF10: 4

## 2021-06-22 ASSESSMENT — PAIN DESCRIPTION - FREQUENCY
FREQUENCY: CONTINUOUS

## 2021-06-22 ASSESSMENT — PAIN DESCRIPTION - DESCRIPTORS
DESCRIPTORS: ACHING
DESCRIPTORS: ACHING
DESCRIPTORS: ACHING;DISCOMFORT
DESCRIPTORS: ACHING
DESCRIPTORS: ACHING
DESCRIPTORS: ACHING;DISCOMFORT
DESCRIPTORS: ACHING
DESCRIPTORS: ACHING

## 2021-06-22 ASSESSMENT — PAIN DESCRIPTION - PAIN TYPE
TYPE: SURGICAL PAIN
TYPE: ACUTE PAIN
TYPE: ACUTE PAIN;SURGICAL PAIN
TYPE: ACUTE PAIN;SURGICAL PAIN
TYPE: SURGICAL PAIN;ACUTE PAIN
TYPE: SURGICAL PAIN
TYPE: ACUTE PAIN;SURGICAL PAIN

## 2021-06-22 ASSESSMENT — PAIN DESCRIPTION - PROGRESSION
CLINICAL_PROGRESSION: GRADUALLY WORSENING
CLINICAL_PROGRESSION: NOT CHANGED

## 2021-06-22 ASSESSMENT — PAIN DESCRIPTION - LOCATION
LOCATION: ABDOMEN

## 2021-06-22 ASSESSMENT — PAIN DESCRIPTION - ONSET
ONSET: ON-GOING
ONSET: GRADUAL

## 2021-06-22 NOTE — PROGRESS NOTES
Vitals checked per surgery protocol at this time. Temperature noted to be 100.2. Tylenol given at this time. Pt continues to rate her pain a 3/10, but states this is manageable and only happens when she is moving. Pt denies any further needs.

## 2021-06-22 NOTE — PROGRESS NOTES
Patient returned from surgery at this time. Pt is sleepy but awakens easily to voice. Vitals and reassessment obtained as charted. Dressings to laparoscopic incisions x3 are dry and intact. Upper and lower incisions have scant amount of drainage noted. Umbilicus dressing is clean, dry and intact. Pt given ice chips, jello and ice water at this time. SCD's applied to BLE. Pt c/o pain in her abdomen after transfering self. Ice pack applied at this time. Pt denies any further needs at this time.

## 2021-06-22 NOTE — PROGRESS NOTES
Discharge Criteria    Inpatients must meet Criteria 1 through 7. All other patients are either YES or N/A. If a NO is chosen then Anesthesia or Surgeon must be notified. 1.  Minimum 30 minutes after last dose of sedative medication, minimum 120 minutes after last dose of reversal agent. Yes      2. Systolic BP stable within 20 mmHg for 30 minutes & systolic BP between 90 & 879 or within 10 mmHg of baseline. Yes      3. Pulse between 60 and 100 or within 10 bpm of baseline. Yes      4. Spontaneous respiratory rate >/= 10 per minute. Yes      5. SaO2 >/= 95 or  >/= baseline. Yes      6. Able to cough and swallow or return to baseline function. Yes      7. Alert and oriented or return to baseline mental status. Yes      8. Demonstrates controlled, coordinated movements, ambulates with steady gait, or return to baseline activity function. N/A      9. Minimal or no pain or nausea, or at a level tolerable and acceptable to patient. Yes      10. Takes and retains oral fluids as allowed. N/A      11. Procedural / perioperative site stable. Minimal or no bleeding. N/A          12. If GI endoscopy procedure, minimal or no abdominal distention or passing flatus. N/A      13. Written discharge instructions and emergency telephone number provided. N/A      14. Accompanied by a responsible adult.     N/A

## 2021-06-22 NOTE — H&P
[facility] MEDICINE            HISTORY AND PHYSICAL     Pt Name: Higinio Arreguin  MRN: [id number]  [place] 1950  Date of evaluation: 6/21/2021  Provider: Mateusz Ogden Dr       Chief Complaint   Patient presents with    Abdominal Pain     RLQ    Fever         HISTORY OF PRESENT ILLNESS      History Obtained From:  patient  PCP: Evaristo Gamez MD    History of Present Illness: The patient is a 73 y.o. female who presents with lower abdominal discomfort that started earlier in the day. Associated symptoms include fever. Patient reports febrile up to 103 at home, states she then went to urgent care and she was 101. She took Tylenol for her symptoms. She states that given her increased heart rate and her worsening pain in the outpatient setting with a told patient to go to the ER. In the ER was found to have acute appendicitis. Patient reports pain is still present but she is feeling better after IV fluids. She denies any pelvic discomfort. Denies any chest pain or shortness of breath. She denies any history of bowel surgeries in the past.  She has no other complaints at this time. Past Medical History:        Diagnosis Date    Asymptomatic varicose veins     Hyperlipidemia     Hypertension     Hypothyroidism     Neuropathy     Tremor        Past Surgical History:        Procedure Laterality Date    ACHILLES TENDON SURGERY  2005    rupture repair    COLONOSCOPY  2006    2 benign polyps    COLONOSCOPY  2009    no polyps this time    COLONOSCOPY  9/8/2015    Dr. Korin Shore - hemorrhoids, no polyps    THYROID SURGERY  1980    nodule removed        Medications Prior to Admission:    Prior to Admission medications    Medication Sig Start Date End Date Taking?  Authorizing Provider   meloxicam (MOBIC) 7.5 MG tablet TAKE 1 TABLET BY MOUTH DAILY AS NEEDED FOR PAIN ((START AFTER PREDNISONE TAPER IS COMPLETED)) 4/19/21  Yes Evaristo Gamez MD discharge, redness and visual disturbance. Respiratory: Negative for cough, chest tightness, shortness of breath and wheezing. Cardiovascular: Negative for chest pain and palpitations. Gastrointestinal: Positive for abdominal pain and nausea. Negative for blood in stool, constipation, diarrhea and vomiting. Endocrine: Negative for polydipsia, polyphagia and polyuria. Genitourinary: Negative for decreased urine volume, difficulty urinating, dysuria, frequency and hematuria. Musculoskeletal: Negative for arthralgias, back pain and myalgias. Skin: Negative for pallor and rash. Allergic/Immunologic: Negative for food allergies and immunocompromised state. Neurological: Negative for dizziness, syncope, weakness and light-headedness. Hematological: Negative for adenopathy. Does not bruise/bleed easily. Psychiatric/Behavioral: Negative for behavioral problems and suicidal ideas. The patient is not nervous/anxious. All other systems were reviewed with the patient and are negative except as stated    Objective:    Vitals:   Temp: 98.8 °F (37.1 °C)  BP: (!) 116/56  Resp: 14  Pulse: 109  SpO2: 95 %  -----------------------------------------------------------------  Exam:  GEN: Well-developed well-nourished no obvious distress  HEENT: Pupils are equal round and reactive to light. Mucous membranes are moist.  Uvula is midline. No oral pharyngeal swelling or edema. NECK: Supple, no tenderness  CVS: Regular rate regular rhythm, no murmurs  PULM: Clear to auscultation bilaterally. No respiratory distress  ABD: Abdomen is soft but with tenderness in the right lower quadrant. Positive McBurney's point. EXT: Moves all 4 extremities with ease. No significant swelling. No tenderness. NEURO: Patient is awake alert and oriented to person place time and situation. No focal neurologic deficit. SKIN: No rashes. No skin lesions. -----------------------------------------------------------------  Diagnostic Data:   · All diagnostic data was reviewed  Lab Results   Component Value Date    WBC 15.2 (H) 06/21/2021    HGB 14.4 06/21/2021    MCV 86.0 06/21/2021     06/21/2021      Lab Results   Component Value Date    GLUCOSE 154 (H) 06/21/2021    BUN 24 (H) 06/21/2021    CREATININE 1.03 (H) 06/21/2021     06/21/2021    K 2.9 (LL) 06/21/2021    CALCIUM 9.0 06/21/2021     06/21/2021    CO2 25 06/21/2021     Lab Results   Component Value Date    WBCUA 2 TO 5 06/21/2021    RBCUA 0 TO 2 06/21/2021    EPITHUA 0 TO 2 06/21/2021    LEUKOCYTESUR TRACE (A) 06/21/2021    SPECGRAV 1.015 06/21/2021    GLUCOSEU NEGATIVE 06/21/2021    KETUA NEGATIVE 06/21/2021    PROTEINU NEGATIVE 06/21/2021    HGBUR NEGATIVE 06/21/2021    CASTUA NOT REPORTED 06/21/2021    CRYSTUA NOT REPORTED 06/21/2021    BACTERIA TRACE (A) 06/21/2021    YEAST NOT REPORTED 06/21/2021       CT ABDOMEN PELVIS W IV CONTRAST Additional Contrast? None   Final Result   1. CT findings consistent with acute uncomplicated appendicitis   2. 1 cm soft tissue nodule along the non dependent portion of the wall the   gallbladder fundus. Differential considerations would include gallbladder   carcinoma versus focal adenomyomatosis. RECOMMENDATIONS:   5 mm right solid pulmonary nodule. No routine follow-up imaging is   recommended. These guidelines do not apply to immunocompromised patients and patients with   cancer. Follow up in patients with significant comorbidities as clinically   warranted. For lung cancer screening, adhere to Lung-RADS guidelines. Reference: Radiology. 2017; 284(1):228-43. Assessment/ Plan:   61-year-old female presented with fever and abdominal pain found to have acute appendicitis. Plan for admission. 1.  Acute appendicitis  -Patient is admitted to [facility] 5 floor for further evaluation and treatment.   Estimated length of stay is greater than 3 midnights. Consult already placed to Paty Edge, general surgeon, in the ER. Plan for the OR in the morning. Patient will be made n.p.o. She is given Zosyn in the ER we will continue IV Zosyn. 2.  Hypokalemia  -Patient on multiple medications which can likely be the source of her hypokalemia. She does not have profound diarrhea. We will place her on cardiac telemetry monitoring. She has a normal magnesium. We will replace potassium IV. 3.  Gallbladder lesion  -Soft tissue nodule noted on CT. This was discussed with patient. It was also discussed with general surgeon who will try to take a picture of it in the OR. Patient with no right upper quadrant tenderness on exam.  This will need continued follow-up at discharge. DVT prophylaxis:  Pharmacologic held due to surgery will place compression devices. Nutrition status:  Good, patient will be made n.p.o. until after surgery    CODE STATUS:  Full    This is a 73 y.o. female admitted for acute appendicitis. This patient requires an inpatient admission as expected length of stay is greater than 3 midnights. Reviewed old charts and EKG. Consult made to general surgeon, Dr. Katharine Aschoff. Advance directive discussed. Discussed all this with the patient who is in agreements.

## 2021-06-22 NOTE — PROGRESS NOTES
Occupational Therapy   Occupational Therapy Initial Assessment  Date: 2021   Patient Name: Danny Zimmerman  MRN: [id number]     : 1950        OT evaluation was attempted post-surgery. Patient reports she was previously independent with ADLs/IADLs and has no concerns with daily tasks at this time. Patient has been going to the bathroom on her own and does not required skilled OT services at this time.        Fany Seats, MOT, OTR/L

## 2021-06-22 NOTE — ANESTHESIA POSTPROCEDURE EVALUATION
Department of Anesthesiology  Postprocedure Note    Patient: Masood Rodriguez  MRN: [id number]  YOB: 1950  Date of evaluation: 6/22/2021  Time:  11:20 AM     Procedure Summary     Date: 06/22/21 Room / Location: [facility] OR 2 / [facility]    Anesthesia Start: 3803 Anesthesia Stop: 8769    Procedure: APPENDECTOMY LAPAROSCOPIC (N/A ) Diagnosis: (ACUTE APPENDICITIS)    Surgeons: Irina Jalloh MD Responsible Provider: SEVERO Garza CRNA    Anesthesia Type: regional, general ASA Status: 3 - Emergent          Anesthesia Type: regional, general    Ana Phase I: Ana Score: 9    Ana Phase II:      Last vitals: Reviewed and per EMR flowsheets.        Anesthesia Post Evaluation    Patient location during evaluation: bedside  Patient participation: complete - patient participated  Level of consciousness: awake and alert  Pain score: 4  Airway patency: patent  Nausea & Vomiting: no nausea and no vomiting  Complications: no  Cardiovascular status: hemodynamically stable  Respiratory status: acceptable  Hydration status: stable

## 2021-06-22 NOTE — PROGRESS NOTES
Myriam Turcios NP notified of new potassium level. She states she will notify Dr. Day Chung to see if he is okay with patient leaving with recent temperature readings and if an antibiotic is needed. She will then notfiy the night shift nurse.

## 2021-06-22 NOTE — PROGRESS NOTES
Progress Note    SUBJECTIVE: Follow-up on abdominal pain    OBJECTIVE: Patient resting in bed alert but drowsy just returned from surgery for appendectomy. Patient denies nausea or vomiting. Patient complains of some abdominal pain. Vitals:   Vitals:    06/22/21 1315   BP: (!) 108/58   Pulse: 70   Resp: 16   Temp: 99.2 °F (37.3 °C)   SpO2: 93%     Weight: 168 lb 9.6 oz (76.5 kg)   Height: 5' 9\" (175.3 cm)   -----------------------------------------------------------------  Exam:    CONSTITUTIONAL:  awake, alert, cooperative, no apparent distress, and appears stated age  EYES:  Lids and lashes normal, pupils equal, round and reactive to light, extra ocular muscles intact, sclera clear, conjunctiva normal  ENT:  Normocephalic, without obvious abnormality, atraumatic, sinuses nontender on palpation, external ears without lesions, oral pharynx with moist mucus membranes, tonsils without erythema or exudates, gums normal and good dentition. NECK:  supple, symmetrical, trachea midline and skin normal  HEMATOLOGIC/LYMPHATICS:  no cervical lymphadenopathy and no supraclavicular lymphadenopathy  LUNGS:  No increased work of breathing, good air exchange, clear to auscultation bilaterally, no crackles or wheezing  CARDIOVASCULAR:  Normal apical impulse, regular rate and rhythm, normal S1 and S2, no S3 or S4, and no murmur noted  ABDOMEN: Soft tender nondistended, bowel sounds x4, 3 dressings minimal drainage  MUSCULOSKELETAL:  There is no redness, warmth, or swelling of the joints. Full range of motion noted. Motor strength is 5 out of 5 all extremities bilaterally.   Tone is normal.  NEUROLOGIC:  Mental Status Exam:  Level of Alertness:   awake  Orientation:   person, place, time  Memory:   normal  SKIN:  no bruising or bleeding, normal skin color, texture, turgor, no redness, warmth, or swelling, no rashes and no lesions  EXT:     no cyanosis, clubbing or edema present -----------------------------------------------------------------  Diagnostic Data: Reviewed    ASSESSMENT:   Principal Problem:    Acute appendicitis  Active Problems:    Hypertension    Hypokalemia  Resolved Problems:    * No resolved hospital problems.  *        PLAN:  · Acute appendicitis  · Appreciate general surgery  · IV fluids  · Pain control  · Ambulate  · IV Zosyn x1 dose per surgery  · Hypokalemia  · Potassium replacement  · Potassium was 2.9 on admission we will recheck 1 hour post replacement  · Hypertension  · High risk medications: Potassium  · Discharge plan: Home later today      SEVERO Narayan - CNP , SEVERO, NP-C

## 2021-06-22 NOTE — ED PROVIDER NOTES
[facility]  EMERGENCY DEPARTMENT ENCOUNTER      Pt Name: Martha Robertson  MRN: [id number]  [place] 1950  Date of evaluation: 6/21/2021  Provider: Theodore Darden, [address] [place] [address]     Chief Complaint   Patient presents with    Abdominal Pain     RLQ    Fever         HISTORY OF PRESENT ILLNESS   (Location/Symptom, Timing/Onset, Context/Setting,Quality, Duration, Modifying Factors, Severity)  Note limiting factors. Martha Robertson is a80 y.o. female who presents to the emergency department with complaints of right lower quadrant abdominal discomfort that started this afternoon and become worsened this evening. Associated complaints clued fever of 103 at home and then a fever of 101 in urgent care. She reports she went to urgent care initially who sent her to the ER for further evaluation. She denies any chest pain or shortness of breath. Denies any diarrhea or constipation. Denies any dysuria or hematuria. Denies any increase or decrease in urine output. No other complaints at this time. HPI    Nursing Notes werereviewed. REVIEW OF SYSTEMS    (2-9 systems for level 4, 10 or more for level 5)     Review of Systems   Constitutional: Positive for fever. Negative for chills and diaphoresis. HENT: Negative for congestion, ear pain, rhinorrhea and sore throat. Eyes: Negative for discharge, redness and visual disturbance. Respiratory: Negative for cough, chest tightness, shortness of breath and wheezing. Cardiovascular: Negative for chest pain and palpitations. Gastrointestinal: Positive for abdominal pain. Negative for blood in stool, constipation, diarrhea, nausea and vomiting. Endocrine: Negative for polydipsia, polyphagia and polyuria. Genitourinary: Negative for decreased urine volume, difficulty urinating, dysuria, frequency and hematuria. Musculoskeletal: Negative for arthralgias, back pain and myalgias. Skin: Negative for pallor and rash. Allergic/Immunologic: Negative for food allergies and immunocompromised state. Neurological: Negative for dizziness, syncope, weakness and light-headedness. Hematological: Negative for adenopathy. Does not bruise/bleed easily. Psychiatric/Behavioral: Negative for behavioral problems and suicidal ideas. The patient is not nervous/anxious. Except as noted above the remainder of the review of systems was reviewed and negative. PAST MEDICAL HISTORY     Past Medical History:   Diagnosis Date    Asymptomatic varicose veins     Hyperlipidemia     Hypertension     Hypothyroidism     Neuropathy     Tremor          SURGICALHISTORY       Past Surgical History:   Procedure Laterality Date    ACHILLES TENDON SURGERY  2005    rupture repair    COLONOSCOPY  2006    2 benign polyps    COLONOSCOPY  2009    no polyps this time    COLONOSCOPY  9/8/2015    Dr. Feliberto Hicks - hemorrhoids, no polyps    THYROID SURGERY  1980    nodule removed          CURRENT MEDICATIONS       Previous Medications    ALPHA-LIPOIC ACID 600 MG CAPS    Take by mouth 2 times daily    ATORVASTATIN (LIPITOR) 20 MG TABLET    TAKE 1 TABLET BY MOUTH EVERY DAY    B COMPLEX VITAMINS CAPSULE    Take 1 capsule by mouth daily    BIOTIN 2500 MCG CAPS    Take by mouth 2 times daily    CHOLECALCIFEROL (VITAMIN D-3 PO)    Take by mouth daily    LEVOTHYROXINE (SYNTHROID) 75 MCG TABLET    TAKE 1 TABLET BY MOUTH EVERY DAY    LORATADINE (CLARITIN) 10 MG CAPSULE    Take 10 mg by mouth daily    MAGNESIUM OXIDE (MAG-OX) 400 MG TABLET    Take 400 mg by mouth daily    MELOXICAM (MOBIC) 7.5 MG TABLET    TAKE 1 TABLET BY MOUTH DAILY AS NEEDED FOR PAIN ((START AFTER PREDNISONE TAPER IS COMPLETED))    MULTIPLE VITAMINS-MINERALS (MULTIVITAMIN & MINERAL PO)    Take by mouth    PREGABALIN (LYRICA) 75 MG CAPSULE    Take 75 mg by mouth 2 times daily.     TRIAMTERENE-HYDROCHLOROTHIAZIDE (DYAZIDE) 37.5-25 MG PER CAPSULE    Take 1 capsule by mouth every morning / pt requests 30 day & [facility]    ZONISAMIDE (ZONEGRAN) 25 MG CAPSULE    Take 25 mg by mouth daily         ALLERGIES   Patient has no known allergies. FAMILY HISTORY       Family History   Problem Relation Age of Onset    High Cholesterol Mother     High Blood Pressure Mother     Alzheimer's Disease Mother     High Cholesterol Brother     Diabetes Father 76    COPD Father          at 80 from COPD complications    Alzheimer's Disease Maternal Grandfather           SOCIAL HISTORY       Social History     Socioeconomic History    Marital status: Single     Spouse name: None    Number of children: None    Years of education: None    Highest education level: None   Occupational History    None   Tobacco Use    Smoking status: Never Smoker    Smokeless tobacco: Never Used   Substance and Sexual Activity    Alcohol use: No     Alcohol/week: 0.0 standard drinks    Drug use: Never    Sexual activity: None   Other Topics Concern    None   Social History Narrative    None     Social Determinants of Health     Financial Resource Strain:     Difficulty of Paying Living Expenses:    Food Insecurity:     Worried About Running Out of Food in the Last Year:     Ran Out of Food in the Last Year:    Transportation Needs:     Lack of Transportation (Medical):      Lack of Transportation (Non-Medical):    Physical Activity:     Days of Exercise per Week:     Minutes of Exercise per Session:    Stress:     Feeling of Stress :    Social Connections:     Frequency of Communication with Friends and Family:     Frequency of Social Gatherings with Friends and Family:     Attends Baptism Services:     Active Member of Clubs or Organizations:     Attends Club or Organization Meetings:     Marital Status:    Intimate Partner Violence:     Fear of Current or Ex-Partner:     Emotionally Abused:     Physically Abused:     Sexually Abused:        SCREENINGS             PHYSICAL EXAM    (up to 7 for level 4, 8 or RT REFLEX TO CULTURE - Abnormal; Notable for the following components:    Leukocyte Esterase, Urine TRACE (*)     All other components within normal limits   MICROSCOPIC URINALYSIS - Abnormal; Notable for the following components:    Bacteria, UA TRACE (*)     All other components within normal limits   CULTURE, BLOOD 1   CULTURE, BLOOD 1   LIPASE   LACTIC ACID, PLASMA   MAGNESIUM       All other labs were within normal range or not returned as of this dictation. EMERGENCY DEPARTMENT COURSE and DIFFERENTIAL DIAGNOSIS/MDM:   Vitals:    Vitals:    06/21/21 2030 06/21/21 2045 06/21/21 2158 06/21/21 2200   BP: 97/66 (!) 107/55  (!) 116/56   Pulse:    109   Resp:   14    Temp:       SpO2: 93% 91%  95%         MDM  Higinio Arreguin is a 73 y.o. female who presents to the emergency department with complaints of abdominal pain; will order CBC CMP and lipase amylase UA lactic acid. Rule out infection, dehydration, electroylte imbalance, colitis, diverticulitis, appendicitis, pancreatitis, cholelithiasis, nephrolithiasis, obstruction, mass, AAA    I updated patient on her hypokalemia and plan for CT. Given white count fever at home prior to arrival we will start her on Zosyn. CT showing acute appendicitis as well as lesion around the gallbladder and a small lung nodule. I updated patient on all of these. I called and spoke with Neftali Ghosh, general surgeon on-call who is aware of patient's presentation work-up in the ER and agrees to take the patient to surgery. Will admit to medicine. I called and talked to Dr. Joao Gonzáles who is on for Dr. Perfecto Johnson. Aware patient presentation work-up here in the ER. Agrees to meet for Dr. Perfecto Johnson. Procedures    FINAL IMPRESSION      1. Acute appendicitis, unspecified acute appendicitis type    2. Hypokalemia        DISPOSITION/PLAN   DISPOSITION Decision To Admit 06/21/2021 11:00:41 PM      PATIENT REFERRED TO:  No follow-up provider specified.     DISCHARGE MEDICATIONS:  New

## 2021-06-22 NOTE — ED NOTES
Called Dr Lucien Schilling via answering service, waiting for call back     Liliane Aguilera  06/21/21 0184

## 2021-06-22 NOTE — PROGRESS NOTES
reports pain when she moves  Pain Screening  Patient Currently in Pain: Yes          Orientation  Orientation  Overall Orientation Status: Within Normal Limits  Social/Functional History  Social/Functional History  Lives With: Other (comment) (lives in a house with 6 other sisters)  Type of Home: House  ADL Assistance: Independent  Homemaking Assistance: Independent  Ambulation Assistance: Independent  Transfer Assistance: Independent  Cognition        Objective          AROM RLE (degrees)  RLE AROM: WFL  AROM LLE (degrees)  LLE AROM : WFL  Strength RLE  Comment: grossly 4/5  Strength LLE  Comment: grossly 4/5        Bed mobility  Supine to Sit: Contact guard assistance  Sit to Supine: Contact guard assistance  Transfers  Sit to Stand: Contact guard assistance  Stand to sit: Contact guard assistance  Ambulation  Ambulation?: Yes  Ambulation 1  Surface: level tile  Device: No Device  Assistance: Contact guard assistance  Distance: 25 feet     Balance  Posture: Good  Sitting - Static: Good  Sitting - Dynamic: Good  Standing - Static: Good;-  Standing - Dynamic: Fair;+        Plan   Plan  Times per week: 7x/wk  Times per day: Twice a day  Plan weeks: 2x/daily except weekends 1x/daily  Current Treatment Recommendations: Strengthening, Neuromuscular Re-education, Home Exercise Program, Manual Therapy - Soft Tissue Mobilization, Balance Training, Endurance Training, Patient/Caregiver Education & Training, Functional Mobility Training, Transfer Training, Gait Training, Stair training  Safety Devices  Type of devices: Call light within reach, Left in bed    G-Code       OutComes Score                                                  AM-PAC Score     AM-PAC Inpatient Mobility without Stair Climbing Raw Score : 15 (06/22/21 1627)  AM-PAC Inpatient without Stair Climbing T-Scale Score : 43.03 (06/22/21 1627)  Mobility Inpatient CMS 0-100% Score: 47.43 (06/22/21 1627)  Mobility Inpatient without Stair CMS G-Code Modifier : CK (06/22/21 5227)       Goals  Short term goals  Time Frame for Short term goals: 20 days  Short term goal 1: Pt will be educated on her POC and HEP  Short term goal 2: Pt will perform all transfers Mod I in order to increase independence  Short term goal 3: Pt will ambulate 100 feet with no AD Zelal[place] in order to return to PLOF       Therapy Time   Individual Concurrent Group Co-treatment   Time In 1550         Time Out 1607         Minutes 30 Di Foster, PT

## 2021-06-22 NOTE — PROGRESS NOTES
[facility]  Inpatient/Observation/Outpatient Rehabilitation    Date: 2021  Patient Name: Blair Murray       [x] Inpatient Acute/Observation  : 1950     [x] Pt cancelled due to:  [] No Reason Given   [] Sick/ill   [x] Other:  AM PT eval attempted: Pt transferred for surgery    Will attempt evaluation at our earliest opportunity.        Alessio Grady, PT, DPT Date: 2021

## 2021-06-22 NOTE — PROGRESS NOTES
Met with Patient this a.m. to discuss her discharge plans. Patient is a 73year old single, white female, admitted with a diagnosis of Acute Appendicitis. She is alert and oriented, pleasant and cooperative for this assessment. States that she wishes to return home following this hospitalization. Patient is a [place] [employer]. She resides at [facility] with other sisters in her [employer]. She works for the [employer]. Patient is independent with all ADL's. She uses no DME and has no outside services currently in place. Patient drives herself and provides for her own transportation needs. PCP is Dr. Daria Nagel. Patient has prescription drug insurance coverage and denies needing further assistance with the cost of her medications. Discharge plan is home with family when stable. Patient is a 'Full Code' status and states that she does have her medical directives in place. Encouraged her to provide copy for medical record as she is able. No further needs or concerns are identified by Patient at this time. LSW to monitor and assist with further discharge planning needs as they arise.     Juan Antonioda. Li [address] 71, [facility]  6/22/2021

## 2021-06-22 NOTE — CONSULTS
Rosa Philip is an 73 y.o.  female. Allergies: No Known Allergies    Principal Problem:    Acute appendicitis  Resolved Problems:    * No resolved hospital problems. *    Blood pressure 110/60, pulse 83, temperature 99.5 °F (37.5 °C), temperature source Oral, resp. rate 18, height 5' 9\" (1.753 m), weight 168 lb 9.6 oz (76.5 kg), SpO2 94 %, not currently breastfeeding. 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 Initially generalized, then localized to the right lower quadrant. Fever of 103 at home, 101 on admission. Vital signs stable. Nauseated without emesis. No recent sick contacts nor travel. ER work-up showing WBC 15, H/H 14/42, LFTs and pancreatic enzymes normal, UA negative. CT scan abdomen pelvis confirming acute appendicitis without rupture. Incidental finding of 1 cm soft tissue density in the dependent portion of the gallbladder fundus. No recent weight changes, 168 pounds, BMI 25. No prior abdominal surgery. Colonoscopies with benign polypectomies 2006, 2009, 2015. Patient has never smoked. At this time, she is resting comfortably. Pain is still present but improved. No new complaints. Review of Systems   Constitutional: Negative for activity change, appetite change, chills, fever and unexpected weight change. HENT: Negative for nosebleeds, sneezing, sore throat and trouble swallowing. Eyes: Negative for visual disturbance. Respiratory: Negative for cough, choking and shortness of breath. Cardiovascular: Negative for chest pain, palpitations and leg swelling. Gastrointestinal: Positive for abdominal distention, abdominal pain and nausea. Negative for blood in stool and vomiting. Genitourinary: Negative for dysuria, flank pain and hematuria. Musculoskeletal: Positive for arthralgias.  Negative for back pain, gait problem and myalgias. Allergic/Immunologic: Negative for immunocompromised state. Neurological: Negative for dizziness, seizures, syncope, weakness and headaches. Hematological: Does not bruise/bleed easily. Psychiatric/Behavioral: Negative for confusion and sleep disturbance.         Past Medical History:   Diagnosis Date    Asymptomatic varicose veins     Hyperlipidemia     Hypertension     Hypothyroidism     Neuropathy     Tremor        Past Surgical History:   Procedure Laterality Date    ACHILLES TENDON SURGERY  2005    rupture repair    COLONOSCOPY  2006    2 benign polyps    COLONOSCOPY  2009    no polyps this time    COLONOSCOPY  2015    Dr. Marko Castillo - hemorrhoids, no polyps    THYROID SURGERY      nodule removed        Family History   Problem Relation Age of Onset    High Cholesterol Mother     High Blood Pressure Mother     Alzheimer's Disease Mother     High Cholesterol Brother     Diabetes Father 76    COPD Father          at 80 from COPD complications    Alzheimer's Disease Maternal Grandfather        Allergies:  See list    Current Facility-Administered Medications   Medication Dose Route Frequency Provider Last Rate Last Admin    piperacillin-tazobactam (ZOSYN) 3,375 mg in dextrose 5 % 50 mL IVPB extended infusion (mini-bag)  3,375 mg Intravenous Once Lana Still MD        Texanna Idol Hold] 0.9 % sodium chloride infusion   Intravenous Continuous Horace Francois PA-C 75 mL/hr at 21 0055 New Bag at 21 0055    [MAR Hold] sodium chloride flush 0.9 % injection 10 mL  10 mL Intravenous 2 times per day Horace Francois PA-C        Texanna Idol Hold] sodium chloride flush 0.9 % injection 10 mL  10 mL Intravenous PRN Horace Francois PA-C        Texanna Idol Hold] 0.9 % sodium chloride infusion  25 mL Intravenous PRN Horace Francois PA-C        Texanna Idol Hold] ondansetron (ZOFRAN-ODT) disintegrating tablet 4 mg  4 mg Oral Q8H PRN Horace Francois PA-C        Or    [MAR Hold] ondansetron  Sexually Abused:        Physical Exam  Vitals and nursing note reviewed. Constitutional:       Appearance: She is well-developed. HENT:      Head: Normocephalic and atraumatic. Eyes:      General: No scleral icterus. Conjunctiva/sclera: Conjunctivae normal.      Pupils: Pupils are equal, round, and reactive to light. Neck:      Vascular: No JVD. Trachea: No tracheal deviation. Cardiovascular:      Rate and Rhythm: Normal rate and regular rhythm. Pulmonary:      Effort: Pulmonary effort is normal. No respiratory distress. Chest:      Chest wall: No tenderness. Abdominal:      General: There is no distension. Palpations: Abdomen is soft. There is no mass. Tenderness: There is no abdominal tenderness. There is no guarding or rebound. Musculoskeletal:         General: Normal range of motion. Cervical back: Normal range of motion and neck supple. Lymphadenopathy:      Cervical: No cervical adenopathy. Skin:     General: Skin is warm and dry. Findings: No erythema or rash. Neurological:      Mental Status: She is alert and oriented to person, place, and time. Cranial Nerves: No cranial nerve deficit. Psychiatric:         Behavior: Behavior normal.         Thought Content:  Thought content normal.         Judgment: Judgment normal.       Basic Metabolic Panel w/ Reflex to MG [[id number]] (Abnormal)    Collected: 06/22/21 0550    Updated: 06/22/21 0646    Specimen Source: Blood     Glucose 126High  mg/dL    BUN 21 mg/dL    CREATININE 0.85 mg/dL    Bun/Cre Ratio 25High     Calcium 8.9 mg/dL    Sodium 141 mmol/L    Potassium 3.3Low  mmol/L    Chloride 104 mmol/L    CO2 28 mmol/L    Anion Gap 9 mmol/L    GFR Non-African American >60 mL/min    GFR African American >60 mL/min    GFR Comment         Comment: Average GFR for 61-76 years old:    85 mL/min/1.73sq m   Chronic Kidney Disease:    <60 mL/min/1.73sq m   Kidney failure:    <15 mL/min/1.73sq m             06/21/21 2246    Narrative:     EXAMINATION:   CT OF THE ABDOMEN AND PELVIS WITH CONTRAST 6/21/2021 4:05 pm     TECHNIQUE:   CT of the abdomen and pelvis was performed with the administration of   intravenous contrast. Multiplanar reformatted images are provided for review. Dose modulation, iterative reconstruction, and/or weight based adjustment of   the mA/kV was utilized to reduce the radiation dose to as low as reasonably   achievable. COMPARISON:   None. HISTORY:   ORDERING SYSTEM PROVIDED HISTORY: [facility] abdominal pain   TECHNOLOGIST PROVIDED HISTORY:   [facility] abdominal pain     Decision Support Exception - unselect if not a suspected or confirmed   emergency medical condition->Emergency Medical Condition (MA)     FINDINGS:   Lower Chest: 5 mm noncalcified nodule is present within the right lung base. Dependent changes are present bilaterally. Organs: The liver, spleen, pancreas, and adrenal glands appear normal.   Cholelithiasis is present without evidence of cholecystitis.  An approximate   1 cm soft tissue nodule is present within the non dependent portion of the   right lateral gallbladder wall.  Differential considerations would include   focal gallbladder carcinoma versus focal adenomyomatosis.  No intrarenal   calculi or evidence of hydronephrosis is present. GI/Bowel: Stomach is nondistended.  Small bowel appears normal without   evidence of obstruction.  Multiple calcified appendicoliths are present   within the base and proximal appendix.  Remainder of the pending sys mildly   dilated and fluid-filled.  Mild degree of surrounding inflammation is   present.  Findings are consistent with acute uncomplicated appendicitis.  No   abscess formation or free air is present. Pelvis: Urinary bladder appears normal.  Focal uterine calcifications are   present, consistent with calcified fibroids.      Peritoneum/Retroperitoneum: No free fluid or free air is present within the   abdomen or pelvis.  No aneurysm formation is noted.  No pathological   adenopathy is present. Bones/Soft Tissues: A small umbilical hernia is present containing fat. Degenerative changes are present within the spine.  No acute osseous   abnormality is present. Impression:     1. CT findings consistent with acute uncomplicated appendicitis   2. 1 cm soft tissue nodule along the non dependent portion of the wall the   gallbladder fundus.  Differential considerations would include gallbladder   carcinoma versus focal adenomyomatosis. RECOMMENDATIONS:   5 mm right solid pulmonary nodule. No routine follow-up imaging is   recommended. These guidelines do not apply to immunocompromised patients and patients with   cancer. Follow up in patients with significant comorbidities as clinically   warranted. For lung cancer screening, adhere to Lung-RADS guidelines. Reference: Radiology. 2017; 284(1):228-43.     Microscopic Urinalysis [[id number]] (Abnormal)    Collected: 06/21/21 2136    Updated: 06/21/21 2209     -         WBC, UA 2 TO 5 /HPF    RBC, UA 0 TO 2 /HPF    Casts UA NOT REPORTED /LPF    Crystals, UA NOT REPORTED /HPF    Epithelial Cells UA 0 TO 2 /HPF    Renal Epithelial, UA NOT REPORTED /HPF    Bacteria, UA TRACEAbnormal     Mucus, UA NOT REPORTED    Trichomonas, UA NOT REPORTED    Amorphous, UA NOT REPORTED    Other Observations UA NOT REPORTED    Yeast, UA NOT REPORTED   Urinalysis Reflex to Culture [[id number]] (Abnormal)    Collected: 06/21/21 2136    Updated: 06/21/21 2209    Specimen Source: Urine, clean catch     Color, UA YELLOW    Turbidity UA CLEAR    Glucose, Ur NEGATIVE    Bilirubin Urine NEGATIVE    Ketones, Urine NEGATIVE    Specific [place], UA 1.015    Urine Hgb NEGATIVE    pH, UA 5.5    Protein, UA NEGATIVE    Urobilinogen, Urine Normal    Nitrite, Urine NEGATIVE    Leukocyte Esterase, Urine TRACEAbnormal     Urinalysis Comments NOT REPORTED   Magnesium [[id number]]    Collected: 06/21/21 2110 Updated: 06/21/21 2200    Specimen Source: Blood     Magnesium 2.2 mg/dL   Culture, Blood 1 [[id number]]    Collected: 06/21/21 2140    Updated: 06/21/21 2153    Specimen Source: Blood    Culture, Blood 1 [[id number]]    Collected: 06/21/21 2143    Updated: 06/21/21 2148    Specimen Source: Blood    Comprehensive Metabolic Panel [[id number]] (Abnormal)    Collected: 06/21/21 2110    Updated: 06/21/21 2143    Specimen Source: Blood     Glucose 154High  mg/dL    BUN 24High  mg/dL    CREATININE 1. 03High  mg/dL    Bun/Cre Ratio 23High     Calcium 9.0 mg/dL    Sodium 137 mmol/L    Potassium 2.9Low Panic   mmol/L    Chloride 100 mmol/L    CO2 25 mmol/L    Anion Gap 12 mmol/L    Alkaline Phosphatase 75 U/L    ALT 25 U/L    AST 31 U/L    Total Bilirubin 1. 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 Plan:    Discussed at length with Sister Jens Pereyra the nature of her abdominal pain and findings of acute appendicitis on imaging. We will proceed with laparoscopic appendectomy, possible open. Risks, benefits, alternatives thoroughly reviewed and accepted by Thang More, including bleeding, infection, internal organ injury, COVID-19 exposure/infection, etc. OR staff is been mobilized.     Jenni Peabody, MD  6/22/2021

## 2021-06-22 NOTE — PROGRESS NOTES
Pt reassessed at this time. Pt is alert and oriented x4. Pt states her pain is better since right after surgery and manageable. Writer offered pt tylenol but she declines at this time. Abdominal dressings continues to be dry and intact. Old drainage noted to upper and lower incision. Pt assisted to the bathroom and back to the chair. Pt tolerated well with en even, steady gait. Pt educated on splinting when having to use abdominal muscles. Pt verbalizes understanding, Pt was able to void but missed urine hat. Pt denies any further needs at this time.

## 2021-06-22 NOTE — PROGRESS NOTES
Temperature rechecked at this time and was 100.5. Potassium 1 hour post infusion also drawn at this time. Writer will update Vipin KAN of recent temperatures.

## 2021-06-22 NOTE — PROGRESS NOTES
Pt assessed and vitals obtained at this time as charted. Pt is alert and oriented x4. Pt c/o 5/10 pain in her RLQ of her abdomen. Pt offered PRN tylenol and she accepted. PRN tylenol administered at 0640. Bowel sounds are active in all quadrants. Tenderness noted to RLQ where patient is having pain. Writer updated patient that surgery will be up shortly to take her down to the preop area. Pt verbalizes understanding and denies any further needs at present time.

## 2021-06-22 NOTE — OP NOTE
fashion. An incision was carried down through the  skin and subcutaneous tissues just below the umbilicus. It was deepened  bluntly to the rectus fascia. Stay sutures of 0 Vicryl were placed in  the fascia with anterior traction along the stay sutures. The rectus  fascia was opened under direct vision followed by the peritoneum. Finger sweep showed no anterior adhesions. A 12 mm balloon Visiport was  then placed under direct vision into the abdominal cavity. A  pneumoperitoneum was then established to 15 mmHg. A 12 mm epigastric  port and a 5 mm suprapubic port were placed under direct vision. The  patient was placed in reverse Trendelenburg. The gallbladder was  visually inspected. The gallbladder was distended. The serosa had a  normal appearance in the region of the CT abnormality, noting a 1 cm  soft tissue density in the gallbladder fundus, showed no obvious  clinical correlation. The patient was then placed in reverse  Trendelenburg, left side down. The teniae of the colon were followed to  their confluence at the cecum where the base of the appendix was  identified. The appendix was acutely inflamed. It was dissected from  the surrounding tissues and mobilized to the midline. It was found to  be grossly inflamed, partially necrotic with no rupture. A window was  created through the mesoappendix, through which an Endo GORDO was passed  and fired across the appendiceal base at its confluence with the cecum. The GORDO was fired additional times across the mesoappendix dividing the  appendiceal vasculature. Once freed, the appendix was placed in an  EndoCatch bag and removed through the umbilical port site and passed off  as specimen. The right lower quadrant was then thoroughly irrigated  with gentamicin saline solution until clear. Final inspection of the  staple lines found them to be hemostatic and intact following topical  application of fibrillar. There was no evidence of bile leak. The  ports were removed under direct vision again assuring hemostasis. The  umbilical and epigastric port sites were closed by reapproximation of  the rectus fascia with 0 Vicryl in a figure-of-eight fashion. Skin  edges were reapproximated with skin staples, then covered with  bacitracin and sterile dressings. All sponge, needle, and instrument  counts were correct at the end of the case. Beal catheter was removed. The patient tolerated the procedure well and was transferred to PACU in  stable condition. SPECIMENS:  Appendix. DRAINS:  None. COMPLICATIONS:  None. DISPOSITION:  To PACU extubated, awake, alert, and stable. Following  recovery, we will return the patient to the surgical floor to receive  one more dose of IV antibiotics with discharge home thereafter with  instructions for no heavy lifting nor abdominal straining for the next  few weeks. We will request gallbladder ultrasound as an outpatient.    Followup will be with me in one to two weeks for staple removal.        Mili Du    D: 06/22/2021 9:19:25       T: 06/22/2021 9:24:37     [email or web address]  Job#: [id number]     Doc#: [id number]    CC:  Kp Tabares

## 2021-06-23 VITALS
HEIGHT: 69 IN | BODY MASS INDEX: 26.15 KG/M2 | TEMPERATURE: 96.5 F | DIASTOLIC BLOOD PRESSURE: 59 MMHG | WEIGHT: 176.56 LBS | OXYGEN SATURATION: 93 % | HEART RATE: 64 BPM | SYSTOLIC BLOOD PRESSURE: 94 MMHG | RESPIRATION RATE: 18 BRPM

## 2021-06-23 LAB
ABSOLUTE EOS #: 0 K/UL (ref 0–0.44)
ABSOLUTE IMMATURE GRANULOCYTE: 0 K/UL (ref 0–0.3)
ABSOLUTE LYMPH #: 0.86 K/UL (ref 1.1–3.7)
ABSOLUTE MONO #: 0.65 K/UL (ref 0.1–1.2)
ANION GAP SERPL CALCULATED.3IONS-SCNC: 8 MMOL/L (ref 9–17)
BASOPHILS # BLD: 0 % (ref 0–2)
BASOPHILS ABSOLUTE: 0 K/UL (ref 0–0.2)
BUN BLDV-MCNC: 20 MG/DL (ref 8–23)
BUN/CREAT BLD: 19 (ref 9–20)
CALCIUM SERPL-MCNC: 8.5 MG/DL (ref 8.6–10.4)
CHLORIDE BLD-SCNC: 105 MMOL/L (ref 98–107)
CO2: 26 MMOL/L (ref 20–31)
CREAT SERPL-MCNC: 1.04 MG/DL (ref 0.5–0.9)
DIFFERENTIAL TYPE: ABNORMAL
EOSINOPHILS RELATIVE PERCENT: 0 % (ref 1–4)
GFR AFRICAN AMERICAN: >60 ML/MIN
GFR NON-AFRICAN AMERICAN: 53 ML/MIN
GFR SERPL CREATININE-BSD FRML MDRD: ABNORMAL ML/MIN/{1.73_M2}
GFR SERPL CREATININE-BSD FRML MDRD: ABNORMAL ML/MIN/{1.73_M2}
GLUCOSE BLD-MCNC: 116 MG/DL (ref 70–99)
HCT VFR BLD CALC: 37.6 % (ref 36.3–47.1)
HEMOGLOBIN: 12.1 G/DL (ref 11.9–15.1)
IMMATURE GRANULOCYTES: 0 %
LYMPHOCYTES # BLD: 8 % (ref 24–43)
MCH RBC QN AUTO: 28.5 PG (ref 25.2–33.5)
MCHC RBC AUTO-ENTMCNC: 32.2 G/DL (ref 28.4–34.8)
MCV RBC AUTO: 88.7 FL (ref 82.6–102.9)
MONOCYTES # BLD: 6 % (ref 3–12)
MORPHOLOGY: NORMAL
NRBC AUTOMATED: 0 PER 100 WBC
PDW BLD-RTO: 13.1 % (ref 11.8–14.4)
PLATELET # BLD: 170 K/UL (ref 138–453)
PLATELET ESTIMATE: ABNORMAL
PMV BLD AUTO: 11.2 FL (ref 8.1–13.5)
POTASSIUM SERPL-SCNC: 4.2 MMOL/L (ref 3.7–5.3)
RBC # BLD: 4.24 M/UL (ref 3.95–5.11)
RBC # BLD: ABNORMAL 10*6/UL
SEG NEUTROPHILS: 86 % (ref 36–65)
SEGMENTED NEUTROPHILS ABSOLUTE COUNT: 9.29 K/UL (ref 1.5–8.1)
SODIUM BLD-SCNC: 139 MMOL/L (ref 135–144)
SURGICAL PATHOLOGY REPORT: NORMAL
WBC # BLD: 10.8 K/UL (ref 3.5–11.3)
WBC # BLD: ABNORMAL 10*3/UL

## 2021-06-23 PROCEDURE — 85025 COMPLETE CBC W/AUTO DIFF WBC: CPT

## 2021-06-23 PROCEDURE — 2580000003 HC RX 258: Performed by: NURSE PRACTITIONER

## 2021-06-23 PROCEDURE — 80048 BASIC METABOLIC PNL TOTAL CA: CPT

## 2021-06-23 PROCEDURE — 6360000002 HC RX W HCPCS: Performed by: NURSE PRACTITIONER

## 2021-06-23 PROCEDURE — 2580000003 HC RX 258: Performed by: SURGERY

## 2021-06-23 PROCEDURE — 94761 N-INVAS EAR/PLS OXIMETRY MLT: CPT

## 2021-06-23 PROCEDURE — 36415 COLL VENOUS BLD VENIPUNCTURE: CPT

## 2021-06-23 PROCEDURE — 6370000000 HC RX 637 (ALT 250 FOR IP): Performed by: NURSE PRACTITIONER

## 2021-06-23 RX ORDER — POTASSIUM CHLORIDE 20 MEQ/1
20 TABLET, EXTENDED RELEASE ORAL DAILY
Qty: 30 TABLET | Refills: 5 | Status: SHIPPED | OUTPATIENT
Start: 2021-06-23 | End: 2021-11-18 | Stop reason: SDUPTHER

## 2021-06-23 RX ADMIN — LEVOTHYROXINE SODIUM 75 MCG: 0.07 TABLET ORAL at 07:20

## 2021-06-23 RX ADMIN — PREGABALIN 75 MG: 75 CAPSULE ORAL at 09:01

## 2021-06-23 RX ADMIN — TRIAMTERENE AND HYDROCHLOROTHIAZIDE 1 TABLET: 37.5; 25 TABLET ORAL at 09:16

## 2021-06-23 RX ADMIN — SODIUM CHLORIDE, PRESERVATIVE FREE 10 ML: 5 INJECTION INTRAVENOUS at 09:16

## 2021-06-23 RX ADMIN — PIPERACILLIN AND TAZOBACTAM 3375 MG: 3; .375 INJECTION, POWDER, FOR SOLUTION INTRAVENOUS at 05:16

## 2021-06-23 ASSESSMENT — PAIN SCALES - GENERAL: PAINLEVEL_OUTOF10: 0

## 2021-06-23 NOTE — PROGRESS NOTES
Writer at bedside to assess vitals and shift assessment. Temp elevated and tylenol not due at this time. Top blanket, socks and SCDs removed at this time to assist with temp. Patient denies pain at rest and no nausea. Patient denies needs at this time. Call light and bedside table within reach. Will continue to monitor.

## 2021-06-23 NOTE — PROGRESS NOTES
Discharge instructions reviewed with pt, Pt. Alert and oriented x 4 talking in full sentences, pt. Denies any pain. Pt verbalizes understanding of d/c instructions.

## 2021-06-23 NOTE — PROGRESS NOTES
Patient to front entrance via wheelchair with all personal belongings for discharge home at this time.

## 2021-06-23 NOTE — PROGRESS NOTES
Progress Note    SUBJECTIVE: Follow abdominal pain    OBJECTIVE: Sitting up in chair alert and oriented in no acute distress. Tolerating her activity and ambulating. Passing gas. Tolerating diet well. No further fevers. Her pain is controlled. Vitals:   Vitals:    06/23/21 0903   BP:    Pulse:    Resp:    Temp:    SpO2: 93%     Weight: 176 lb 9 oz (80.1 kg)   Height: 5' 9\" (175.3 cm)   -----------------------------------------------------------------  Exam:    CONSTITUTIONAL:  awake, alert, cooperative, no apparent distress, and appears stated age  EYES:  Lids and lashes normal, pupils equal, round and reactive to light, extra ocular muscles intact, sclera clear, conjunctiva normal  ENT:  Normocephalic, without obvious abnormality, atraumatic, sinuses nontender on palpation, external ears without lesions, oral pharynx with moist mucus membranes, tonsils without erythema or exudates, gums normal and good dentition. NECK:  supple, symmetrical, trachea midline and skin normal  HEMATOLOGIC/LYMPHATICS:  no cervical lymphadenopathy and no supraclavicular lymphadenopathy  LUNGS:  No increased work of breathing, good air exchange, clear to auscultation bilaterally, no crackles or wheezing  CARDIOVASCULAR:  Normal apical impulse, regular rate and rhythm, normal S1 and S2, no S3 or S4, and no murmur noted  ABDOMEN: Soft nontender nondistended, bowel sounds x4, 3 dressings minimal drainage  MUSCULOSKELETAL:  There is no redness, warmth, or swelling of the joints. Full range of motion noted. Motor strength is 5 out of 5 all extremities bilaterally.   Tone is normal.  NEUROLOGIC:  Mental Status Exam:  Level of Alertness:   awake  Orientation:   person, place, time  Memory:   normal  SKIN:  no bruising or bleeding, normal skin color, texture, turgor, no redness, warmth, or swelling, no rashes and no lesions  EXT:      no cyanosis, clubbing or edema present -----------------------------------------------------------------  Diagnostic Data: Reviewed    ASSESSMENT:   Principal Problem:    Acute appendicitis  Active Problems:    Hypertension    Hypokalemia  Resolved Problems:    * No resolved hospital problems. *        PLAN:  · Acute appendicitis  ? Appreciate general surgery  ? IV fluids  ? Pain control  ? Ambulate  ? IV Zosyn x1 dose per surgery  · Hypokalemia  ? Resolved  ? Potassium replacement  ? Potassium was 2.9 on admission currently 4.2  · Hypertension  · High risk medications: Potassium  · Discharge plan: Home later today.   Start Klor-Con 20 mEq daily      SEVERO Ace - CNP , APRN, NP-C

## 2021-06-23 NOTE — PROGRESS NOTES
Pt. Is alert and oriented x 4 upon assess ment, pt. Denies any pain at this time. Pt. States she is feeling much better today and is hopeful about getting discharged. Pt. Vitals were WDL. Pt. No longer has a fever. Pt. Skin is pink warm and dry, lungs were clear bilaterally. Pt. Heart was regular. Pt. Abdomen is active x4 and soft non-tender. Pt. Incisions were assessed, Dressings are all dry and intact. Superior and inferior dressings have a scant amount of sanguinous fluid on it. Pt. Has no edema noted. Pt. Is resting in bed at this time and appears comfortable at this time, no signs of distress. Pt. Call light is in reach and gripper socks are on.

## 2021-06-23 NOTE — PROGRESS NOTES
[facility]  Inpatient/Observation/Outpatient Rehabilitation    Date: 2021  Patient Name: Eleni Chirinos       [x] Inpatient Acute/Observation       []  Outpatient  : 1950           [x] Pt refused/declined therapy at this time due to:      Pt feels that she is Independent and doesn't need PT at this time. Pt being d/c'd home per pt today. NRSG(Cathy) agrees with pt that she no longer needs therapy at this time.            Yari Granado, PTA Date: 2021

## 2021-06-23 NOTE — DISCHARGE SUMMARY
Discharge Summary    Liliana Neff  :  1950  MRN:  [id number]    Admit date:  2021      Discharge date: 2021     Admitting Physician:  No admitting provider for patient encounter. Discharge Diagnoses:    Principal Problem:    Acute appendicitis  Active Problems:    Hypertension    Hypokalemia  Resolved Problems:    * No resolved hospital problems. *      Hospital Course:   Liliana Neff is a 73 y.o. female admitted with acute appendicitis. She resented to the emergency room with complaints of abdominal disc comfort. She stated that she also had fever with T-max 103.0. She stated she did take Tylenol for symptom her symptoms however the pain continued. She stated that she noticed that her heart rate was increasing thus came to the emergency room for evaluation. Patient was evaluated and found to have an acute appendicitis. She was admitted placed on IV antibiotics. She also was found to be hypokalemic and was provided potassium replacement. Patient was taken to surgery for laparoscopic appendectomy. She tolerated this procedure well. She did have low-grade fevers postop and was placed on IV Zosyn. Patient is currently passing flatus is afebrile and tolerating diet well. Her potassium is returned to normal 4.2. She currently is on HCTZ at home I will send her home with potassium 20 mEq daily. She will follow-up with her primary care as well as general surgery.     Consultants:  Dr. Sakina Kelly, gen surg    Procedures: Laparoscopic appendectomy    Complications: none    Discharge Condition: good    Exam:  Shreya Goltz, alert, cooperative, no apparent distress, and appears stated age  EYES:  Lids and lashes normal, pupils equal, round and reactive to light, extra ocular muscles intact, sclera clear, conjunctiva normal  ENT:  Normocephalic, without obvious abnormality, atraumatic, sinuses nontender on palpation, external ears without lesions, oral pharynx with moist mucus membranes, tonsils without erythema or exudates, gums normal and good dentition.   NECK:  supple, symmetrical, trachea midline and skin normal  HEMATOLOGIC/LYMPHATICS:  no cervical lymphadenopathy and no supraclavicular lymphadenopathy  LUNGS:  No increased work of breathing, good air exchange, clear to auscultation bilaterally, no crackles or wheezing  CARDIOVASCULAR:  Normal apical impulse, regular rate and rhythm, normal S1 and S2, no S3 or S4, and no murmur noted  ABDOMEN: Soft nontender nondistended, bowel sounds x4, 3 dressings minimal drainage  MUSCULOSKELETAL:  There is no redness, warmth, or swelling of the joints.  Full range of motion noted.  Motor strength is 5 out of 5 all extremities bilaterally.  Tone is normal.  NEUROLOGIC:  Mental Status Exam:  Level of [facility], place, time  Memory:   normal  SKIN:  no bruising or bleeding, normal skin color, texture, turgor, no redness, warmth, or swelling, no rashes and no lesions  EXT:      no cyanosis, clubbing or edema present      Significant Diagnostic Studies:   Lab Results   Component Value Date    WBC 10.8 06/23/2021    HGB 12.1 06/23/2021     06/23/2021       Lab Results   Component Value Date    BUN 20 06/23/2021    CREATININE 1.04 (H) 06/23/2021     06/23/2021    K 4.2 06/23/2021    CALCIUM 8.5 (L) 06/23/2021     06/23/2021    CO2 26 06/23/2021    LABGLOM 53 (L) 06/23/2021       Lab Results   Component Value Date    WBCUA 2 TO 5 06/21/2021    RBCUA 0 TO 2 06/21/2021    EPITHUA 0 TO 2 06/21/2021    LEUKOCYTESUR TRACE (A) 06/21/2021    SPECGRAV 1.015 06/21/2021    GLUCOSEU NEGATIVE 06/21/2021    KETUA NEGATIVE 06/21/2021    PROTEINU NEGATIVE 06/21/2021    HGBUR NEGATIVE 06/21/2021    CASTUA NOT REPORTED 06/21/2021    CRYSTUA NOT REPORTED 06/21/2021    BACTERIA TRACE (A) 06/21/2021    YEAST NOT REPORTED 06/21/2021       CT ABDOMEN PELVIS W IV CONTRAST Additional Contrast? None    Result Date: 6/21/2021  EXAMINATION: CT OF THE ABDOMEN AND PELVIS WITH CONTRAST 6/21/2021 4:05 pm TECHNIQUE: CT of the abdomen and pelvis was performed with the administration of intravenous contrast. Multiplanar reformatted images are provided for review. Dose modulation, iterative reconstruction, and/or weight based adjustment of the mA/kV was utilized to reduce the radiation dose to as low as reasonably achievable. COMPARISON: None. HISTORY: ORDERING SYSTEM PROVIDED HISTORY: [facility] abdominal pain TECHNOLOGIST PROVIDED HISTORY: [facility] abdominal pain Decision Support Exception - unselect if not a suspected or confirmed emergency medical condition->Emergency Medical Condition (MA) FINDINGS: Lower Chest: 5 mm noncalcified nodule is present within the right lung base. Dependent changes are present bilaterally. Organs: The liver, spleen, pancreas, and adrenal glands appear normal. Cholelithiasis is present without evidence of cholecystitis. An approximate 1 cm soft tissue nodule is present within the non dependent portion of the right lateral gallbladder wall. Differential considerations would include focal gallbladder carcinoma versus focal adenomyomatosis. No intrarenal calculi or evidence of hydronephrosis is present. GI/Bowel: Stomach is nondistended. Small bowel appears normal without evidence of obstruction. Multiple calcified appendicoliths are present within the base and proximal appendix. Remainder of the pending sys mildly dilated and fluid-filled. Mild degree of surrounding inflammation is present. Findings are consistent with acute uncomplicated appendicitis. No abscess formation or free air is present. Pelvis: Urinary bladder appears normal.  Focal uterine calcifications are present, consistent with calcified fibroids. Peritoneum/Retroperitoneum: No free fluid or free air is present within the abdomen or pelvis. No aneurysm formation is noted. No pathological adenopathy is present.  Bones/Soft Tissues: A small umbilical hernia is present containing fat. Degenerative changes are present within the spine. No acute osseous abnormality is present. 1. CT findings consistent with acute uncomplicated appendicitis 2. 1 cm soft tissue nodule along the non dependent portion of the wall the gallbladder fundus. Differential considerations would include gallbladder carcinoma versus focal adenomyomatosis. RECOMMENDATIONS: 5 mm right solid pulmonary nodule. No routine follow-up imaging is recommended. These guidelines do not apply to immunocompromised patients and patients with cancer. Follow up in patients with significant comorbidities as clinically warranted. For lung cancer screening, adhere to Lung-RADS guidelines. Reference: Radiology. 2017; 284(1):228-43. Assessment and Plan:  Patient Active Problem List    Diagnosis Date Noted    Hypokalemia 06/22/2021    Acute appendicitis 06/21/2021    Hypothyroidism     Hyperlipidemia     Hypertension     Asymptomatic varicose veins         Discharge Medications:       Kari Caraballo   Home Medication Instructions [facility]:[id number]    Printed on:06/23/21 1116   Medication Information                      Alpha-Lipoic Acid 600 MG CAPS  Take by mouth 2 times daily             atorvastatin (LIPITOR) 20 MG tablet  TAKE 1 TABLET BY MOUTH EVERY DAY             b complex vitamins capsule  Take 1 capsule by mouth daily             Biotin 2500 MCG CAPS  Take by mouth 2 times daily             Cholecalciferol (VITAMIN D-3 PO)  Take by mouth daily             HYDROcodone-acetaminophen (NORCO) 5-325 MG per tablet  Take 1 tablet by mouth every 6 hours as needed for Pain for up to 3 days. May take up to 2 tablets po every 6 hours as needed for pain.              levothyroxine (SYNTHROID) 75 MCG tablet  TAKE 1 TABLET BY MOUTH EVERY DAY             loratadine (CLARITIN) 10 MG capsule  Take 10 mg by mouth daily             magnesium oxide (MAG-OX) 400 MG tablet  Take 400 mg by mouth daily             meloxicam (MOBIC) 7.5 MG tablet  TAKE 1 TABLET BY MOUTH DAILY AS NEEDED FOR PAIN ((START AFTER PREDNISONE TAPER IS COMPLETED))             Multiple Vitamins-Minerals (MULTIVITAMIN & MINERAL PO)  Take by mouth             potassium chloride (KLOR-CON M) 20 MEQ extended release tablet  Take 1 tablet by mouth daily             pregabalin (LYRICA) 75 MG capsule  Take 75 mg by mouth 2 times daily. triamterene-hydroCHLOROthiazide (DYAZIDE) 37.5-25 MG per capsule  Take 1 capsule by mouth every morning / pt requests 30 day & [facility]             zonisamide (ZONEGRAN) 25 MG capsule  Take 25 mg by mouth daily                 Patient Instructions:    Activity: activity as tolerated  Diet: regular diet  Wound Care: none needed  Other: None    Disposition:   Discharge to Home    Follow up:  Patient will be followed by Nadia Israel MD in 1-2 weeks; Dr. Kiko Cabrera 1 week    CORE MEASURES on Discharge (if applicable)  ACE/ARB in CHF: NA  Statin in MI: NA  ASA in MI: NA  Statin in CVA: NA  Antiplatelet in CVA: NA    Total time spent on discharge services: 40 minutes    Including the following activities:  Evaluation and Management of patient  Discussion with patient and/or surrogate about current care plan  Coordination with Case Management and/or   Coordination of care with Consultants (if applicable)   Coordination of care with Receiving Facility Physician (if applicable)  Completion of DME forms (if applicable)  Preparation of Discharge Summary  Preparation of Medication Reconciliation  Preparation of Discharge Prescriptions    Signed:  SEVERO Dixon - CNP, SEVERO, NP-C  6/23/2021, 11:16 AM

## 2021-06-23 NOTE — PLAN OF CARE
Problem: Pain:  Goal: Pain level will decrease  Description: Pain level will decrease  Outcome: Ongoing  Note: Notify staff of returning or increasing pain. Utilize pain medication as appropriate. Use non-pharmaceutical means for pain management ie: warm blanket, ice, repositioning.      Goal: Control of acute pain  Description: Control of acute pain  Outcome: Ongoing

## 2021-06-26 LAB
CULTURE: NORMAL
CULTURE: NORMAL
Lab: NORMAL
Lab: NORMAL
SPECIMEN DESCRIPTION: NORMAL
SPECIMEN DESCRIPTION: NORMAL

## 2021-06-30 ENCOUNTER — HOSPITAL ENCOUNTER (OUTPATIENT)
Dept: ULTRASOUND IMAGING | Age: 71
Discharge: HOME | End: 2021-07-02
Payer: MEDICARE

## 2021-06-30 DIAGNOSIS — R10.9 ABDOMINAL PAIN, UNSPECIFIED ABDOMINAL LOCATION: ICD-10-CM

## 2021-06-30 PROCEDURE — 76705 ECHO EXAM OF ABDOMEN: CPT

## 2021-07-02 ENCOUNTER — OFFICE VISIT (OUTPATIENT)
Dept: SURGERY | Age: 71
End: 2021-07-02
Payer: MEDICARE

## 2021-07-02 VITALS
HEIGHT: 69 IN | WEIGHT: 162 LBS | RESPIRATION RATE: 18 BRPM | DIASTOLIC BLOOD PRESSURE: 71 MMHG | SYSTOLIC BLOOD PRESSURE: 108 MMHG | TEMPERATURE: 97.4 F | OXYGEN SATURATION: 95 % | BODY MASS INDEX: 23.99 KG/M2 | HEART RATE: 85 BPM

## 2021-07-02 DIAGNOSIS — Z90.49 S/P LAPAROSCOPIC APPENDECTOMY: Primary | ICD-10-CM

## 2021-07-02 DIAGNOSIS — R93.5 ABNORMAL CT OF THE ABDOMEN: ICD-10-CM

## 2021-07-02 DIAGNOSIS — K80.20 CHOLELITHIASIS WITHOUT CHOLECYSTITIS: ICD-10-CM

## 2021-07-02 DIAGNOSIS — K35.891 ACUTE GANGRENOUS APPENDICITIS: ICD-10-CM

## 2021-07-02 DIAGNOSIS — K82.4 GALLBLADDER POLYP: ICD-10-CM

## 2021-07-02 PROCEDURE — 99024 POSTOP FOLLOW-UP VISIT: CPT | Performed by: SURGERY

## 2021-07-02 RX ORDER — PREGABALIN 100 MG/1
300 CAPSULE ORAL DAILY
COMMUNITY
Start: 2021-06-07

## 2021-07-02 NOTE — PATIENT INSTRUCTIONS
SURVEY:    You may be receiving a survey from [employer] regarding your visit today. Please complete the survey to enable us to provide the highest quality of care to you and your family. If you cannot score us as very good on any question, please call the office to discuss how we could have made your experience exceptional.     Thank you. Patient Education        Learning About Gallbladder Removal Surgery  What is it? This surgery removes the gallbladder and gallstones. The gallbladder stores bile made by your liver. The bile helps you digest fats. Gallstones are made of cholesterol and other things found in bile. The surgery is also known as cholecystectomy (dt-[id number]-tuh-chika). Your body will work fine without a gallbladder. Bile will go straight from the liver to the intestine. There may be small changes in how you digest food. But you probably won't notice them. How is the surgery done? This is usually a laparoscopic surgery. To do this type of surgery, a doctor puts a lighted tube, or scope, and other surgical tools through small cuts (incisions) in your belly. The doctor is able to see your organs with the scope. After your gallbladder is removed, you will no longer have gallstones. The cuts leave scars that usually fade with time. Open surgery may be done if problems are found during laparoscopic surgery. With open surgery, the gallbladder is removed through one larger cut in your belly. And the hospital stay is longer. What can you expect after surgery? You will probably feel weak and tired for several days after you return home. Your belly may be swollen. If you had laparoscopic surgery, you may also have pain in your shoulder for about 24 hours. You may have gas or need to burp a lot at first.  A few people get diarrhea. It usually goes away in 2 to 4 weeks. But it may last longer. How quickly you get better depends on which kind of surgery you had.   For laparoscopic surgery, most people can go back to work or their normal routine in 1 to 2 weeks. It depends on the type of work you do and how you feel. If you have open surgery, it will probably take 4 to 6 weeks before you get back to your normal routine. Follow-up care is a key part of your treatment and safety. Be sure to make and go to all appointments, and call your doctor if you are having problems. It's also a good idea to know your test results and keep a list of the medicines you take. Where can you learn more? Go to [email or web address] and sign in to your [email or web address] account. Enter J897 in the [employer] box to learn more about \"Learning About Gallbladder Removal Surgery. \"     If you do not have an account, please click on the \"Sign Up Now\" link. Current as of: February 10, 2021               Content Version: 12.9  © 5589-0155 [employer]. Care instructions adapted under license by [facility] ([facility]). If you have questions about a medical condition or this instruction, always ask your healthcare professional. [place] [address] any warranty or liability for your use of this information.

## 2021-07-28 NOTE — PROGRESS NOTES
Patient instructed on the pre-operative, intra-operative, and post-operative process. Patient's surgery arrival time to the hospital and surgery start time confirmed for the day of surgery. Patient instructed on NPO status. Medication instructions reviewed with patient. Pre operative instruction sheet reviewed and given to patient in PAT. G skin prep instructions reviewed with the patient via telephone. Pt instructed to stop taking all aspirin products, mobic, and nonprescription vitamins for 7 days prior to surgery and to only take lyrica, zonegran, and synthroid the morning of surgery with a sip of water only.

## 2021-08-10 ENCOUNTER — ANESTHESIA EVENT (OUTPATIENT)
Dept: OPERATING ROOM | Age: 71
End: 2021-08-10
Payer: MEDICARE

## 2021-08-11 ENCOUNTER — HOSPITAL ENCOUNTER (OUTPATIENT)
Age: 71
Setting detail: OUTPATIENT SURGERY
Discharge: HOME | End: 2021-08-11
Attending: SURGERY | Admitting: SURGERY
Payer: MEDICARE

## 2021-08-11 ENCOUNTER — ANESTHESIA (OUTPATIENT)
Dept: OPERATING ROOM | Age: 71
End: 2021-08-11
Payer: MEDICARE

## 2021-08-11 VITALS
HEART RATE: 64 BPM | DIASTOLIC BLOOD PRESSURE: 62 MMHG | SYSTOLIC BLOOD PRESSURE: 124 MMHG | OXYGEN SATURATION: 98 % | WEIGHT: 166 LBS | RESPIRATION RATE: 18 BRPM | BODY MASS INDEX: 24.59 KG/M2 | TEMPERATURE: 98.5 F | HEIGHT: 69 IN

## 2021-08-11 VITALS — OXYGEN SATURATION: 99 % | DIASTOLIC BLOOD PRESSURE: 79 MMHG | SYSTOLIC BLOOD PRESSURE: 158 MMHG

## 2021-08-11 DIAGNOSIS — Z90.49 S/P LAPAROSCOPIC CHOLECYSTECTOMY: Primary | ICD-10-CM

## 2021-08-11 PROBLEM — K82.4 GALLBLADDER POLYP: Status: ACTIVE | Noted: 2021-08-11

## 2021-08-11 PROBLEM — K80.20 CHOLELITHIASES: Status: ACTIVE | Noted: 2021-08-11

## 2021-08-11 PROCEDURE — 2580000003 HC RX 258: Performed by: SURGERY

## 2021-08-11 PROCEDURE — S2900 ROBOTIC SURGICAL SYSTEM: HCPCS | Performed by: SURGERY

## 2021-08-11 PROCEDURE — 7100000000 HC PACU RECOVERY - FIRST 15 MIN: Performed by: SURGERY

## 2021-08-11 PROCEDURE — 2500000003 HC RX 250 WO HCPCS: Performed by: NURSE ANESTHETIST, CERTIFIED REGISTERED

## 2021-08-11 PROCEDURE — 6360000002 HC RX W HCPCS: Performed by: NURSE ANESTHETIST, CERTIFIED REGISTERED

## 2021-08-11 PROCEDURE — 6370000000 HC RX 637 (ALT 250 FOR IP): Performed by: SURGERY

## 2021-08-11 PROCEDURE — 7100000011 HC PHASE II RECOVERY - ADDTL 15 MIN: Performed by: SURGERY

## 2021-08-11 PROCEDURE — 88304 TISSUE EXAM BY PATHOLOGIST: CPT

## 2021-08-11 PROCEDURE — 2709999900 HC NON-CHARGEABLE SUPPLY: Performed by: SURGERY

## 2021-08-11 PROCEDURE — 2500000003 HC RX 250 WO HCPCS: Performed by: SURGERY

## 2021-08-11 PROCEDURE — 7100000001 HC PACU RECOVERY - ADDTL 15 MIN: Performed by: SURGERY

## 2021-08-11 PROCEDURE — 7100000010 HC PHASE II RECOVERY - FIRST 15 MIN: Performed by: SURGERY

## 2021-08-11 PROCEDURE — 3700000001 HC ADD 15 MINUTES (ANESTHESIA): Performed by: SURGERY

## 2021-08-11 PROCEDURE — 6360000002 HC RX W HCPCS: Performed by: SURGERY

## 2021-08-11 PROCEDURE — 3600000019 HC SURGERY ROBOT ADDTL 15MIN: Performed by: SURGERY

## 2021-08-11 PROCEDURE — 64488 TAP BLOCK BI INJECTION: CPT | Performed by: NURSE ANESTHETIST, CERTIFIED REGISTERED

## 2021-08-11 PROCEDURE — 3700000000 HC ANESTHESIA ATTENDED CARE: Performed by: SURGERY

## 2021-08-11 PROCEDURE — 3600000009 HC SURGERY ROBOT BASE: Performed by: SURGERY

## 2021-08-11 RX ORDER — ONDANSETRON 2 MG/ML
4 INJECTION INTRAMUSCULAR; INTRAVENOUS
Status: DISCONTINUED | OUTPATIENT
Start: 2021-08-11 | End: 2021-08-11 | Stop reason: HOSPADM

## 2021-08-11 RX ORDER — SODIUM CHLORIDE 0.9 % (FLUSH) 0.9 %
10 SYRINGE (ML) INJECTION EVERY 12 HOURS SCHEDULED
Status: DISCONTINUED | OUTPATIENT
Start: 2021-08-11 | End: 2021-08-11 | Stop reason: HOSPADM

## 2021-08-11 RX ORDER — FENTANYL CITRATE 50 UG/ML
25 INJECTION, SOLUTION INTRAMUSCULAR; INTRAVENOUS EVERY 5 MIN PRN
Status: DISCONTINUED | OUTPATIENT
Start: 2021-08-11 | End: 2021-08-11 | Stop reason: HOSPADM

## 2021-08-11 RX ORDER — ACETAMINOPHEN 325 MG/1
650 TABLET ORAL ONCE
Status: COMPLETED | OUTPATIENT
Start: 2021-08-11 | End: 2021-08-11

## 2021-08-11 RX ORDER — SODIUM CHLORIDE 9 MG/ML
25 INJECTION, SOLUTION INTRAVENOUS PRN
Status: DISCONTINUED | OUTPATIENT
Start: 2021-08-11 | End: 2021-08-11 | Stop reason: HOSPADM

## 2021-08-11 RX ORDER — KETAMINE HCL 50MG/ML(1)
SYRINGE (ML) INTRAVENOUS PRN
Status: DISCONTINUED | OUTPATIENT
Start: 2021-08-11 | End: 2021-08-11 | Stop reason: SDUPTHER

## 2021-08-11 RX ORDER — NEOSTIGMINE METHYLSULFATE 1 MG/ML
INJECTION, SOLUTION INTRAVENOUS PRN
Status: DISCONTINUED | OUTPATIENT
Start: 2021-08-11 | End: 2021-08-11 | Stop reason: SDUPTHER

## 2021-08-11 RX ORDER — DIMENHYDRINATE 50 MG/1
50 TABLET ORAL ONCE
Status: COMPLETED | OUTPATIENT
Start: 2021-08-11 | End: 2021-08-11

## 2021-08-11 RX ORDER — HYDROCODONE BITARTRATE AND ACETAMINOPHEN 5; 325 MG/1; MG/1
2 TABLET ORAL PRN
Status: COMPLETED | OUTPATIENT
Start: 2021-08-11 | End: 2021-08-11

## 2021-08-11 RX ORDER — MIDAZOLAM HYDROCHLORIDE 1 MG/ML
INJECTION INTRAMUSCULAR; INTRAVENOUS PRN
Status: DISCONTINUED | OUTPATIENT
Start: 2021-08-11 | End: 2021-08-11 | Stop reason: SDUPTHER

## 2021-08-11 RX ORDER — GLYCOPYRROLATE 1 MG/5 ML
SYRINGE (ML) INTRAVENOUS PRN
Status: DISCONTINUED | OUTPATIENT
Start: 2021-08-11 | End: 2021-08-11 | Stop reason: SDUPTHER

## 2021-08-11 RX ORDER — ROPIVACAINE HYDROCHLORIDE 5 MG/ML
INJECTION, SOLUTION EPIDURAL; INFILTRATION; PERINEURAL PRN
Status: DISCONTINUED | OUTPATIENT
Start: 2021-08-11 | End: 2021-08-11 | Stop reason: SDUPTHER

## 2021-08-11 RX ORDER — SODIUM CHLORIDE, SODIUM LACTATE, POTASSIUM CHLORIDE, CALCIUM CHLORIDE 600; 310; 30; 20 MG/100ML; MG/100ML; MG/100ML; MG/100ML
INJECTION, SOLUTION INTRAVENOUS CONTINUOUS
Status: DISCONTINUED | OUTPATIENT
Start: 2021-08-11 | End: 2021-08-11 | Stop reason: HOSPADM

## 2021-08-11 RX ORDER — INDOCYANINE GREEN AND WATER 25 MG
7.5 KIT INJECTION ONCE
Status: COMPLETED | OUTPATIENT
Start: 2021-08-11 | End: 2021-08-11

## 2021-08-11 RX ORDER — DEXAMETHASONE SODIUM PHOSPHATE 10 MG/ML
INJECTION INTRAMUSCULAR; INTRAVENOUS PRN
Status: DISCONTINUED | OUTPATIENT
Start: 2021-08-11 | End: 2021-08-11 | Stop reason: SDUPTHER

## 2021-08-11 RX ORDER — ROCURONIUM BROMIDE 10 MG/ML
INJECTION, SOLUTION INTRAVENOUS PRN
Status: DISCONTINUED | OUTPATIENT
Start: 2021-08-11 | End: 2021-08-11 | Stop reason: SDUPTHER

## 2021-08-11 RX ORDER — HYDROCODONE BITARTRATE AND ACETAMINOPHEN 5; 325 MG/1; MG/1
1 TABLET ORAL EVERY 6 HOURS PRN
Qty: 12 TABLET | Refills: 0 | Status: SHIPPED | OUTPATIENT
Start: 2021-08-11 | End: 2021-08-14

## 2021-08-11 RX ORDER — FENTANYL CITRATE 50 UG/ML
50 INJECTION, SOLUTION INTRAMUSCULAR; INTRAVENOUS EVERY 5 MIN PRN
Status: DISCONTINUED | OUTPATIENT
Start: 2021-08-11 | End: 2021-08-11 | Stop reason: HOSPADM

## 2021-08-11 RX ORDER — GINSENG 100 MG
CAPSULE ORAL PRN
Status: DISCONTINUED | OUTPATIENT
Start: 2021-08-11 | End: 2021-08-11 | Stop reason: ALTCHOICE

## 2021-08-11 RX ORDER — HYDROCODONE BITARTRATE AND ACETAMINOPHEN 5; 325 MG/1; MG/1
1 TABLET ORAL PRN
Status: COMPLETED | OUTPATIENT
Start: 2021-08-11 | End: 2021-08-11

## 2021-08-11 RX ORDER — PROPOFOL 10 MG/ML
INJECTION, EMULSION INTRAVENOUS PRN
Status: DISCONTINUED | OUTPATIENT
Start: 2021-08-11 | End: 2021-08-11 | Stop reason: SDUPTHER

## 2021-08-11 RX ORDER — ONDANSETRON 2 MG/ML
INJECTION INTRAMUSCULAR; INTRAVENOUS PRN
Status: DISCONTINUED | OUTPATIENT
Start: 2021-08-11 | End: 2021-08-11 | Stop reason: SDUPTHER

## 2021-08-11 RX ORDER — EPHEDRINE SULFATE/0.9% NACL/PF 50 MG/5 ML
SYRINGE (ML) INTRAVENOUS PRN
Status: DISCONTINUED | OUTPATIENT
Start: 2021-08-11 | End: 2021-08-11 | Stop reason: SDUPTHER

## 2021-08-11 RX ORDER — LIDOCAINE HYDROCHLORIDE 20 MG/ML
INJECTION, SOLUTION EPIDURAL; INFILTRATION; INTRACAUDAL; PERINEURAL PRN
Status: DISCONTINUED | OUTPATIENT
Start: 2021-08-11 | End: 2021-08-11 | Stop reason: SDUPTHER

## 2021-08-11 RX ORDER — FENTANYL CITRATE 50 UG/ML
INJECTION, SOLUTION INTRAMUSCULAR; INTRAVENOUS PRN
Status: DISCONTINUED | OUTPATIENT
Start: 2021-08-11 | End: 2021-08-11 | Stop reason: SDUPTHER

## 2021-08-11 RX ORDER — SODIUM CHLORIDE 0.9 % (FLUSH) 0.9 %
5-40 SYRINGE (ML) INJECTION PRN
Status: DISCONTINUED | OUTPATIENT
Start: 2021-08-11 | End: 2021-08-11 | Stop reason: HOSPADM

## 2021-08-11 RX ORDER — MORPHINE SULFATE 1 MG/ML
1 INJECTION, SOLUTION EPIDURAL; INTRATHECAL; INTRAVENOUS
Status: DISCONTINUED | OUTPATIENT
Start: 2021-08-11 | End: 2021-08-11 | Stop reason: HOSPADM

## 2021-08-11 RX ORDER — SODIUM CHLORIDE 0.9 % (FLUSH) 0.9 %
10 SYRINGE (ML) INJECTION PRN
Status: DISCONTINUED | OUTPATIENT
Start: 2021-08-11 | End: 2021-08-11 | Stop reason: HOSPADM

## 2021-08-11 RX ORDER — DEXAMETHASONE SODIUM PHOSPHATE 4 MG/ML
INJECTION, SOLUTION INTRA-ARTICULAR; INTRALESIONAL; INTRAMUSCULAR; INTRAVENOUS; SOFT TISSUE PRN
Status: DISCONTINUED | OUTPATIENT
Start: 2021-08-11 | End: 2021-08-11 | Stop reason: SDUPTHER

## 2021-08-11 RX ORDER — SODIUM CHLORIDE 0.9 % (FLUSH) 0.9 %
5-40 SYRINGE (ML) INJECTION EVERY 12 HOURS SCHEDULED
Status: DISCONTINUED | OUTPATIENT
Start: 2021-08-11 | End: 2021-08-11 | Stop reason: HOSPADM

## 2021-08-11 RX ADMIN — CEFAZOLIN SODIUM 2000 MG: 10 INJECTION, POWDER, FOR SOLUTION INTRAVENOUS at 11:35

## 2021-08-11 RX ADMIN — FENTANYL CITRATE 50 MCG: 50 INJECTION, SOLUTION INTRAMUSCULAR; INTRAVENOUS at 11:47

## 2021-08-11 RX ADMIN — LIDOCAINE HYDROCHLORIDE 40 MG: 20 INJECTION, SOLUTION EPIDURAL; INFILTRATION; INTRACAUDAL; PERINEURAL at 11:47

## 2021-08-11 RX ADMIN — Medication 0.4 MG: at 12:53

## 2021-08-11 RX ADMIN — ACETAMINOPHEN 650 MG: 325 TABLET ORAL at 09:07

## 2021-08-11 RX ADMIN — Medication 25 MG: at 11:56

## 2021-08-11 RX ADMIN — MIDAZOLAM 2 MG: 1 INJECTION INTRAMUSCULAR; INTRAVENOUS at 09:35

## 2021-08-11 RX ADMIN — DEXAMETHASONE SODIUM PHOSPHATE 4 MG: 4 INJECTION, SOLUTION INTRAMUSCULAR; INTRAVENOUS at 12:18

## 2021-08-11 RX ADMIN — FENTANYL CITRATE 50 MCG: 50 INJECTION, SOLUTION INTRAMUSCULAR; INTRAVENOUS at 13:01

## 2021-08-11 RX ADMIN — PROPOFOL 150 MG: 10 INJECTION, EMULSION INTRAVENOUS at 11:47

## 2021-08-11 RX ADMIN — Medication 15 MG: at 12:22

## 2021-08-11 RX ADMIN — ROCURONIUM BROMIDE 40 MG: 10 INJECTION, SOLUTION INTRAVENOUS at 11:48

## 2021-08-11 RX ADMIN — DIMENHYDRINATE 50 MG: 50 TABLET ORAL at 09:07

## 2021-08-11 RX ADMIN — SODIUM CHLORIDE, POTASSIUM CHLORIDE, SODIUM LACTATE AND CALCIUM CHLORIDE: 600; 310; 30; 20 INJECTION, SOLUTION INTRAVENOUS at 09:08

## 2021-08-11 RX ADMIN — SODIUM CHLORIDE, POTASSIUM CHLORIDE, SODIUM LACTATE AND CALCIUM CHLORIDE: 600; 310; 30; 20 INJECTION, SOLUTION INTRAVENOUS at 12:55

## 2021-08-11 RX ADMIN — INDOCYANINE GREEN AND WATER 7.5 MG: KIT at 10:47

## 2021-08-11 RX ADMIN — ONDANSETRON 4 MG: 2 INJECTION INTRAMUSCULAR; INTRAVENOUS at 12:18

## 2021-08-11 RX ADMIN — HYDROCODONE BITARTRATE AND ACETAMINOPHEN 1 TABLET: 5; 325 TABLET ORAL at 13:55

## 2021-08-11 RX ADMIN — ROPIVACAINE HYDROCHLORIDE 45 ML: 5 INJECTION, SOLUTION EPIDURAL; INFILTRATION; PERINEURAL at 09:45

## 2021-08-11 RX ADMIN — Medication 3 MG: at 12:53

## 2021-08-11 RX ADMIN — DEXAMETHASONE SODIUM PHOSPHATE 10 MG: 10 INJECTION INTRAMUSCULAR; INTRAVENOUS at 09:45

## 2021-08-11 ASSESSMENT — ENCOUNTER SYMPTOMS
ABDOMINAL PAIN: 0
BLOOD IN STOOL: 0
VOMITING: 0
COUGH: 0
CHOKING: 0
TROUBLE SWALLOWING: 0
BACK PAIN: 0
SHORTNESS OF BREATH: 0
NAUSEA: 0
SORE THROAT: 0

## 2021-08-11 ASSESSMENT — PAIN SCALES - GENERAL
PAINLEVEL_OUTOF10: 4
PAINLEVEL_OUTOF10: 5
PAINLEVEL_OUTOF10: 0
PAINLEVEL_OUTOF10: 2
PAINLEVEL_OUTOF10: 2

## 2021-08-11 ASSESSMENT — PAIN DESCRIPTION - ORIENTATION
ORIENTATION: RIGHT;UPPER
ORIENTATION: RIGHT;UPPER

## 2021-08-11 ASSESSMENT — PAIN DESCRIPTION - LOCATION
LOCATION: ABDOMEN
LOCATION: ABDOMEN

## 2021-08-11 ASSESSMENT — PAIN DESCRIPTION - DESCRIPTORS
DESCRIPTORS: ACHING
DESCRIPTORS: ACHING

## 2021-08-11 ASSESSMENT — PAIN DESCRIPTION - PAIN TYPE
TYPE: SURGICAL PAIN
TYPE: SURGICAL PAIN

## 2021-08-11 NOTE — PROGRESS NOTES
Discharge instructions given to patient and patients friend. Both verbalize understanding and denies any questions at this time. Discharge Criteria    Inpatients must meet Criteria 1 through 7. All other patients are either YES or N/A. If a NO is chosen then Anesthesia or Surgeon must be notified. 1.  Minimum 30 minutes after last dose of sedative medication, minimum 120 minutes after last dose of reversal agent. Yes      2. Systolic BP stable within 20 mmHg for 30 minutes & systolic BP between 90 & 199 or within 10 mmHg of baseline. Yes      3. Pulse between 60 and 100 or within 10 bpm of baseline. Yes      4. Spontaneous respiratory rate >/= 10 per minute. Yes      5. SaO2 >/= 95 or  >/= baseline. Yes      6. Able to cough and swallow or return to baseline function. Yes      7. Alert and oriented or return to baseline mental status. Yes      8. Demonstrates controlled, coordinated movements, ambulates with steady gait, or return to baseline activity function. Yes      9. Minimal or no pain or nausea, or at a level tolerable and acceptable to patient. Yes      10. Takes and retains oral fluids as allowed. Yes      11. Procedural / perioperative site stable. Minimal or no bleeding. Yes          12. If GI endoscopy procedure, minimal or no abdominal distention or passing flatus. N/A      13. Written discharge instructions and emergency telephone number provided. Yes      14. Accompanied by a responsible adult.     Yes

## 2021-08-11 NOTE — OP NOTE
[address]                                OPERATIVE REPORT    PATIENT NAME: Whitney Burr                :        1950  MED REC NO:   [id number]                              ROOM:  ACCOUNT NO:   [de-identified]                           ADMIT DATE: 2021  PROVIDER:     Perri Hargrove    DATE OF PROCEDURE:  2021    ATTENDING SURGEON:  Dr. Perri Hargrove. PCP:  Dama Prader, MD.    POSTOPERATIVE DIAGNOSES:  1.  Gallbladder polyp. 2.  Cholelithiasis. POSTOPERATIVE DIAGNOSIS:  Cholelithiasis. OPERATION PERFORMED:  Laparoscopic cholecystectomy, robotic assist.    ANESTHESIA:  General endotracheal tube. IV FLUIDS:  1 liter of crystalloid. ESTIMATED BLOOD LOSS:  Less than 20 mL. INTRAOPERATIVE FINDINGS:  As mentioned above. Cystic duct and artery  clearly visualized confirmed with intraoperative indocyanine green  fluorescence. Cholelithiasis with no obvious gallbladder polyp. Good  postoperative hemostasis. No evidence of bile leak. INDICATIONS:  The patient is a pleasant 62-year-old white female, sister  at [facility], who recently required an appendectomy for gangrenous  appendicitis. During that workup, a 1 cm gallbladder polyp was  described on CT imaging. This was confirmed on ultrasound. Sludge and  stones were also noted. She has had no significant abdominal pain. Perioperative course following laparoscopic appendectomy was  unremarkable. At this time, elective cholecystectomy is indicated. OPERATIVE PROCEDURE:  After obtaining informed consent with discussion  of risks, benefits, and alternatives including a risk bleeding,  infection, bowel/bile duct injury, COVID-19 exposure/infection, etc.,  the patient was taken to the operating theater and placed in the supine  position on the operating table. She received preoperative IV  antibiotics and ELISE sequentials. Following smooth induction of general  anesthesia, she was positioned and prepped and draped in standard  fashion. An incision was carried down through the skin and subcutaneous  tissues just below the umbilicus. It was deepened bluntly to the rectus  fascia. Stay sutures of 0 Vicryl were placed in the fascia with  anterior traction along the stay sutures. A Veress needle was passed  perpendicularly into the abdomen. Two clicks were appreciated. Saline  test showed rapid flow in the abdomen. A pneumoperitoneum was then  established to 15 mmHg. The Veress needle was removed. The fascia was  opened under direct vision followed by the peritoneum. Finger sweep  showed no significant anterior adhesions. A 12 mm balloon Visiport was  then placed under direct vision into the abdominal cavity. The  pneumoperitoneum was then reestablished to 15 mmHg. Initial inspection  of the abdomen showed all visible bowel, colon, and omentum were normal  in appearance with no evidence of needle nor trocar injury. Two 8 mm  ports were placed through the right and left lateral abdominal wall,  anterior axillary line with a third 8 mm port placed in the right upper  quadrant midclavicular line, all under direct vision. The patient was  placed in reverse Trendelenburg, left side down. The da Klever surgical  robot was appropriately docked. The gallbladder was grasped and  retracted superiorly. The infundibulum was taken laterally. The cystic  duct and artery were dissected from the surrounding tissues. These two  structures and only these two structures were seen going directly to the  gallbladder. This was confirmed with intraoperative indocyanine green  fluorescence. The cystic duct and artery were clipped twice along the  patient's side, once along the gallbladder and divided with cautery. The gallbladder was then removed from the undersurface of the liver with  selective use of cautery.   Once freed, the gallbladder was retracted  away. Final inspection of the undersurface of the liver showed  excellent hemostasis. Clips were in place. No evidence of bile leak  again confirmed with intraoperative indocyanine green fluorescence. The  surgical robot was undocked and retracted away. The gallbladder was  placed in an EndoCatch bag and removed through the umbilical port site. Each port was removed under direct vision again assuring hemostasis. The umbilical port site was closed by reapproximation of the rectus  fascia with 0 Vicryl in a figure-of-eight fashion. Skin edges were  reapproximated with skin staples and covered with bacitracin and sterile  dressings. All sponge, needle, and instrument counts were correct at  the end of the case. The patient tolerated the procedure well and was  transferred to PACU in stable condition. SPECIMENS:  Gallbladder with stones. No obvious 1 cm gallbladder polyp. DRAINS:  None. COMPLICATIONS:  None. DISPOSITION:  To PACU extubated, awake, alert, and stable. Following  recovery, we will discharge the patient home with gradual advancement of  diet and activity as tolerated with instructions for no heavy lifting  nor abdominal straining for the next few weeks. Followup will be with  me in one to two weeks to review pathology.         Noe Abdul    D: 08/11/2021 13:10:16       T: 08/11/2021 13:12:51     [email or web address]  Job#: [id number]     Doc#: [id number]    CC:  Gildardo Orta

## 2021-08-11 NOTE — ANESTHESIA PROCEDURE NOTES
Peripheral Block    Patient location during procedure: pre-op  Start time: 8/11/2021 9:35 AM  End time: 8/11/2021 9:45 AM  Staffing  Performed: resident/CRNA   Resident/CRNA: SEVERO Rodriguez CRNA  Preanesthetic Checklist  Completed: patient identified, IV checked, site marked, risks and benefits discussed, surgical consent, monitors and equipment checked, pre-op evaluation, timeout performed, anesthesia consent given, oxygen available and patient being monitored  Peripheral Block  Patient position: supine  Prep: ChloraPrep  Patient monitoring: continuous pulse ox  Block type: TAP  Laterality: bilateral  Injection technique: single-shot  Guidance: ultrasound guided  Local infiltration: bupivacaine and decadron  Infiltration strength: 0.5 %  Dose: 45 mL  Provider prep: sterile gloves and mask  Local infiltration: bupivacaine and decadron  Needle  Needle type:  Other   Needle gauge: 22 G  Needle length: 11 cm  Needle localization: ultrasound guidanceOther needle type: pajunk  Assessment  Injection assessment: negative aspiration for heme, no paresthesia on injection and local visualized surrounding nerve on ultrasound  Paresthesia pain: none  Slow fractionated injection: yes  Hemodynamics: stable  Reason for block: post-op pain management and at surgeon's request

## 2021-08-11 NOTE — ANESTHESIA PRE PROCEDURE
Department of Anesthesiology  Preprocedure Note       Name:  Hunter Casey   Age:  73 y.o.  :  1950                                          MRN:  [id number]         Date:  2021      Surgeon: Therese Adames):  So Mathew MD    Procedure: Procedure(s):  CHOLECYSTECTOMY LAPAROSCOPIC ROBOTIC    Medications prior to admission:   Prior to Admission medications    Medication Sig Start Date End Date Taking? Authorizing Provider   levothyroxine (SYNTHROID) 75 MCG tablet TAKE 1 TABLET BY MOUTH EVERY DAY 21  Yes Sid Blue MD   atorvastatin (LIPITOR) 20 MG tablet TAKE 1 TABLET BY MOUTH EVERY DAY 21  Yes Sid Blue MD   pregabalin (LYRICA) 100 MG capsule  21  Yes Historical Provider, MD   potassium chloride (KLOR-CON M) 20 MEQ extended release tablet Take 1 tablet by mouth daily 21  Yes SEVERO Vora - CNP   meloxicam (MOBIC) 7.5 MG tablet TAKE 1 TABLET BY MOUTH DAILY AS NEEDED FOR PAIN ((START AFTER PREDNISONE TAPER IS COMPLETED)) 21  Yes Sid Blue MD   triamterene-hydroCHLOROthiazide (DYAZIDE) 37.5-25 MG per capsule Take 1 capsule by mouth every morning / pt requests 30 day & [facility] 20  Yes SEVERO Trinh - NP   zonisamide (ZONEGRAN) 25 MG capsule Take 25 mg by mouth daily   Yes Historical Provider, MD   pregabalin (LYRICA) 75 MG capsule Take 75 mg by mouth 2 times daily.    Yes Historical Provider, MD   magnesium oxide (MAG-OX) 400 MG tablet Take 400 mg by mouth daily   Yes Historical Provider, MD   Alpha-Lipoic Acid 600 MG CAPS Take by mouth 2 times daily   Yes Historical Provider, MD   Biotin 2500 MCG CAPS Take by mouth 2 times daily   Yes Historical Provider, MD   loratadine (CLARITIN) 10 MG capsule Take 10 mg by mouth daily   Yes Historical Provider, MD   b complex vitamins capsule Take 1 capsule by mouth daily   Yes Historical Provider, MD   Cholecalciferol (VITAMIN D-3 PO) Take by mouth daily   Yes Historical Provider, MD   Multiple Vitamins-Minerals (MULTIVITAMIN & MINERAL PO) Take by mouth   Yes Historical Provider, MD       Current medications:    Current Facility-Administered Medications   Medication Dose Route Frequency Provider Last Rate Last Admin    lactated ringers infusion   Intravenous Continuous Jerica Rosales  mL/hr at 08/11/21 0908 New Bag at 08/11/21 0908    sodium chloride flush 0.9 % injection 5-40 mL  5-40 mL Intravenous 2 times per day Jerica Rosales MD        sodium chloride flush 0.9 % injection 5-40 mL  5-40 mL Intravenous PRN Jerica Rosales MD        0.9 % sodium chloride infusion  25 mL Intravenous PRN Jerica Rosales MD        ceFAZolin (ANCEF) 2000 mg in dextrose 5 % 100 mL IVPB  2,000 mg Intravenous On Call to [address] Verona Handy MD        indocyanine green (IC-GREEN) syringe 7.5 mg  7.5 mg Intravenous Once Jerica Rosales MD           Allergies:  No Known Allergies    Problem List:    Patient Active Problem List   Diagnosis Code    Hypothyroidism E03.9    Hyperlipidemia E78.5    Hypertension I10    Asymptomatic varicose veins I83.90    Acute appendicitis K35.80    Hypokalemia E87.6       Past Medical History:        Diagnosis Date    Asymptomatic varicose veins     Hyperlipidemia     Hypertension     Hypothyroidism     Neuropathy     Tremor        Past Surgical History:        Procedure Laterality Date    ACHILLES TENDON SURGERY  2005    rupture repair    COLONOSCOPY  2006    2 benign polyps    COLONOSCOPY  2009    no polyps this time    COLONOSCOPY  09/08/2015    Dr. Ese Ray - hemorrhoids, no polyps    LAPAROSCOPIC APPENDECTOMY N/A 06/22/2021    Dr Verona Handy   [address]    nodule removed        Social History:    Social History     Tobacco Use    Smoking status: Never Smoker    Smokeless tobacco: Never Used   Substance Use Topics    Alcohol use: No     Alcohol/week: 0.0 standard drinks                                Counseling given: Not Answered      Vital Signs Results   Component Value Date    COVID19 Not Detected 06/23/2020           Anesthesia Evaluation  Patient summary reviewed and Nursing notes reviewed  Airway: Mallampati: II  TM distance: >3 FB   Neck ROM: full  Mouth opening: > = 3 FB Dental: normal exam         Pulmonary:Negative Pulmonary ROS and normal exam  breath sounds clear to auscultation                             Cardiovascular:Negative CV ROS  Exercise tolerance: good (>4 METS),   (+) hypertension: mild,       ECG reviewed  Rhythm: regular  Rate: normal           Beta Blocker:  Not on Beta Blocker      ROS comment: 6/21/21  EKG  Sinus tachycardia with 1st degree A-V block  Left axis deviation  Prolonged QT  Abnormal ECG  No previous ECGs available     Neuro/Psych:   Negative Neuro/Psych ROS              GI/Hepatic/Renal: Neg GI/Hepatic/Renal ROS            Endo/Other:    (+) hypothyroidism::., .                 Abdominal:             Vascular: negative vascular ROS. Other Findings:             Anesthesia Plan      general     ASA 2       Induction: intravenous. MIPS: Prophylactic antiemetics administered. Anesthetic plan and risks discussed with patient. Plan discussed with CRNA.                   SEVERO Stafford - CRNA   8/11/2021

## 2021-08-11 NOTE — H&P
HPI     Sister Dash Hinds for follow-up after appendectomy.     DATE OF PROCEDURE:  06/22/2021     ATTENDING SURGEON:  Dr. Patricia Valdes.     PCP: Micaela Flynn MD.     PREOPERATIVE DIAGNOSIS:  Acute appendicitis.     POSTOPERATIVE DIAGNOSIS:  Acute appendicitis.     OPERATION PERFORMED:  Laparoscopic appendectomy     She is doing very well. Tolerating diet. Original discomfort has resolved. No nausea, vomiting. No fevers nor chills.      Original evaluation June 22, 2021. Raudel Wallace .     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  Initially generalized, then localized to the right lower quadrant.  Fever of 103 at home, 101 on admission.  Vital signs stable.  Nauseated without emesis.  No recent sick contacts nor travel.  ER work-up showing WBC 15, H/H 14/42, LFTs and pancreatic enzymes normal, UA negative.  CT scan abdomen pelvis confirming acute appendicitis without rupture.  Incidental finding of 1 cm soft tissue density in the dependent portion of the gallbladder fundus.  No recent weight changes, 168 pounds, BMI 25.  No prior abdominal surgery.  Colonoscopies with benign polypectomies 2006, 2009, 2015. aMrta Donohue has never smoked.  At this time, she is resting comfortably. Miguel Sensor is still present but improved.  No new complaints.     Review of Systems   Constitutional: Negative for activity change, appetite change, chills, fever and unexpected weight change. HENT: Negative for nosebleeds, sneezing, sore throat and trouble swallowing. Eyes: Negative for visual disturbance. Respiratory: Negative for cough, choking and shortness of breath. Cardiovascular: Negative for chest pain, palpitations and leg swelling. Gastrointestinal: Negative for abdominal pain, blood in stool, nausea and vomiting. Genitourinary: Negative for dysuria, flank pain and hematuria. Musculoskeletal: Positive for arthralgias. Negative for back pain, gait problem and myalgias. Allergic/Immunologic: Negative for immunocompromised state. Neurological: Positive for numbness. Negative for dizziness, seizures, syncope, weakness and headaches. Hematological: Does not bruise/bleed easily.    Psychiatric/Behavioral: Negative for confusion and sleep disturbance.         Past Medical History        Past Medical History:   Diagnosis Date    Asymptomatic varicose veins      Hyperlipidemia      Hypertension      Hypothyroidism      Neuropathy      Tremor              Past Surgical History         Past Surgical History:   Procedure Laterality Date    ACHILLES TENDON SURGERY   2005     rupture repair    COLONOSCOPY   2006     2 benign polyps    COLONOSCOPY   2009     no polyps this time    COLONOSCOPY   2015     Dr. Mariposa Ram - hemorrhoids, no polyps    LAPAROSCOPIC APPENDECTOMY N/A 2021     Dr Merlin Erm Dal[address] 57     nodule removed             Family History         Family History   Problem Relation Age of Onset    High Cholesterol Mother      High Blood Pressure Mother      Alzheimer's Disease Mother      High Cholesterol Brother      Diabetes Father 76    COPD Father            at 80 from COPD complications    Alzheimer's Disease Maternal Grandfather              Allergies:  See list     Current Facility-Administered Medications   No current facility-administered medications for this visit.      No current outpatient medications on file.                Facility-Administered Medications Ordered in Other Visits   Medication Dose Route Frequency Provider Last Rate Last Admin    lactated ringers infusion   Intravenous Continuous Roberto Iraheta  mL/hr at 21 0908 New Bag at 21 0908    sodium chloride flush 0.9 % injection 5-40 mL  5-40 mL Intravenous 2 times per day Roberto Iraheta MD        sodium chloride flush 0.9 % injection 5-40 mL 5-40 mL Intravenous PRN Hermelinda Heller MD        0.9 % sodium chloride infusion  25 mL Intravenous PRN Hermelinda Heller MD        ceFAZolin (ANCEF) 2000 mg in dextrose 5 % 100 mL IVPB  2,000 mg Intravenous On Call to [address] Ronak Easton MD        ropivacaine (NAROPIN) 0.5% injection   Spinal/Regional PRN [place] Vinny, APRN - CRNA   45 mL at 08/11/21 0945    dexamethasone (DECADRON) injection   Other PRN [place], APRN - CRNA   10 mg at 08/11/21 0945    midazolam (VERSED) injection   Intravenous PRN [place] Vinny, APRN - CRNA   2 mg at 08/11/21 0935            Social History   Social History            Socioeconomic History    Marital status: Single       Spouse name: None    Number of children: None    Years of education: None    Highest education level: None   Occupational History    None   Tobacco Use    Smoking status: Never Smoker    Smokeless tobacco: Never Used   Substance and Sexual Activity    Alcohol use: No       Alcohol/week: 0.0 standard drinks    Drug use: Never    Sexual activity: None   Other Topics Concern    None   Social History Narrative    None      Social Determinants of Health          Financial Resource Strain: Low Risk     Difficulty of Paying Living Expenses: Not hard at all   Food Insecurity: No Food Insecurity    Worried About Running Out of Food in the Last Year: Never true    William of Food in the Last Year: Never true   Transportation Needs:     Lack of Transportation (Medical):      Lack of Transportation (Non-Medical):    Physical Activity:     Days of Exercise per Week:     Minutes of Exercise per Session:    Stress:     Feeling of Stress :    Social Connections:     Frequency of Communication with Friends and Family:     Frequency of Social Gatherings with Friends and Family:     Attends Denominational Services:     Active Member of Clubs or Organizations:     Attends Club or Organization Meetings:     Marital Status:    Intimate Partner Violence:     Fear of Current or Ex-Partner:     Emotionally Abused:     Physically Abused:     Sexually Abused:             /71 (Site: Left Upper Arm, Position: Sitting, Cuff Size: Large Adult)   Pulse 85   Temp 97.4 °F (36.3 °C)   Resp 18   Ht 5' 9\" (1.753 m)   Wt 162 lb (73.5 kg)   SpO2 95%   BMI 23.92 kg/m²       Physical Exam  Vitals and nursing note reviewed. Constitutional:       Appearance: She is well-developed. HENT:      Head: Normocephalic and atraumatic. Eyes:      General: No scleral icterus. Conjunctiva/sclera: Conjunctivae normal.      Pupils: Pupils are equal, round, and reactive to light. Neck:      Vascular: No JVD. Trachea: No tracheal deviation. Cardiovascular:      Rate and Rhythm: Normal rate and regular rhythm. Pulmonary:      Effort: Pulmonary effort is normal. No respiratory distress. Chest:      Chest wall: No tenderness. Abdominal:      General: There is no distension. Palpations: Abdomen is soft. There is no mass. Tenderness: There is no abdominal tenderness. There is no guarding or rebound. Comments: Abdominal incisions are clean, dry, intact. No erythema nor other signs of infection. Musculoskeletal:         General: Normal range of motion. Cervical back: Normal range of motion and neck supple. Lymphadenopathy:      Cervical: No cervical adenopathy. Skin:     General: Skin is warm and dry. Findings: No erythema or rash. Neurological:      Mental Status: She is alert and oriented to person, place, and time. Cranial Nerves: No cranial nerve deficit. Psychiatric:         Behavior: Behavior normal.         Thought Content:  Thought content normal.         Judgment: Judgment normal.         IMAGING/LABS     6/23/2021  2:08 PM - Tres Cerrato Incoming Lab Results From [facility]     Component Collected Lab   Surgical Pathology Report 06/22/2021  3:11 PM [facility]   -- Diagnosis --   APPENDIX: Tito Tolbert SUPPURATIVE AND GANGRENOUS APPENDICITIS WITH   MICROSCOPIC FOCUS OF PERFORATION. Wadell Mode, M   **Electronically Signed Out**         ajb/6/23/2021         Clinical Information   Pre-op Diagnosis:  ACUTE APPENDICITIS   Operative Findings:  APPENDIX   Operation Performed:  LAPAROSCOPIC APPENDECTOMY     Source of Specimen   1: APPENDIX     Gross Description   \"GALEN Swanson Osmin, APPENDIX\" 9.0 cm long x 1.0 cm in diameter   vermiform appendix with a moderate amount of mesoappendix.  The serosa   is focally hemorrhagic with a patchy fibrinopurulent exudate. Sectioning reveals an attenuated wall and the lumen contains a copious   amount of fecal material. Juris June is no fecalith or transmural defect. Tip and cross sections with margin inked black 1cs.   tm       Microscopic Description   Microscopic examination performed.      SURGICAL PATHOLOGY CONSULTATION         Patient Name: Barbara Jones: [id number]   Path Number: [id number]     [address],  [address]. [place], [address]   [phone]   Fax: [phone]    Testing Performed By  [address] [place] [address] Name Director Address Valid Date Range   [facility] 59, [address] 08/30/17 0801-Present   Lab and Collection     Surgical Pathology - 6/22/2021  Result Information     Status: Final result (Collected: 6/22/2021 15:11) Provider Status: Reviewed   All Reviewers List     Surya Michel MD on 6/24/2021 08:27   Anthony Barragan MD on 6/23/2021 16:49   Routing History     Priority Sent On From To Message Type     6/24/2021  1:40 PM Saqib, Mhpn Incoming Lab Results From [facility] [place] Results     6/23/2021  2:08 PM Saqib, M[facility]pn Incoming Lab Results From Dagmar Allen MD Results     6/23/2021  2:08 PM Saqib, Mhpn Incoming Lab Results From [employer] Ben Irving MD CC'd Results   Click to Print Result   View [facility] Info     Surgical Pathology (Order #[id number]) on 6/22/21      US GALLBLADDER RUQ  Status: Final result   Order Providers     Authorizing Encounter Billing   Alexander Sanchez MD [facility] ULTRASOUND ROOM Severiano Dewitt., DO           Signed by     Signed Date/Time Phone Pager   Erin Vu 6/30/2021 11:35 [phone]     Reading Providers     Read Date Phone Pager   Erin Vu Wed Jun 30, 2021 11:35 [phone]     All Reviewers List     Kourtney Masters MD on 6/30/2021 13:51   Routing History     Priority Sent On From To Message Type     6/30/2021 11:38 AM Saqib, Mhpn Incoming Radiant Results From [address] Khris Lynch MD Results     6/30/2021 11:38 AM Saqib, M[facility]pn Incoming Radiant Results From [address] Wright MD CC'd Results   Radiation Dose Estimates     No radiation information found for this patient   Narrative   EXAMINATION:   RIGHT UPPER QUADRANT ULTRASOUND       6/30/2021 10:49 am       COMPARISON:   CT abdomen and pelvis June 21, 2021.       HISTORY:   ORDERING SYSTEM PROVIDED HISTORY: Abdominal pain, unspecified abdominal   location   TECHNOLOGIST PROVIDED HISTORY:       abnormal CT abd/pelvis June 21, 2021, acute appendicits, incidental finding   of 1 cm soft tissue density gallbladder fundus       FINDINGS:   LIVER:  The liver demonstrates normal echogenicity without evidence of   intrahepatic biliary ductal dilatation.       BILIARY SYSTEM:  Polyp seen within the gallbladder lumen measuring 1 cm.    There is associated internal color Doppler vascularity.  Sludge and stones   are also seen within the gallbladder lumen.  No gallbladder wall thickening   or pericholecystic fluid.  Negative Spence's sign.       Common bile duct is within normal limits measuring 3.3 mm.       RIGHT KIDNEY: The right kidney is grossly unremarkable without evidence of   hydronephrosis.       PANCREAS:  Visualized portions of the pancreas are unremarkable.       OTHER: No evidence of right upper quadrant ascites.           Impression   1. 1 cm polyp involving the gallbladder.  Surgical consultation is   recommended. 2. Sludge/stones seen within the gallbladder lumen without ultrasound   evidence of acute cholecystitis.           ASSESSMENT       Diagnosis Orders   1. S/P laparoscopic appendectomy      2. Acute gangrenous appendicitis      3. Abnormal CT of the abdomen      4. Gallbladder polyp      5. Cholelithiasis without cholecystitis      6. 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 No significant interval changes since last evaluation July 2, 2021. Previously discussed abnormal CT and ultrasound findings showing a 1 cm gallbladder polyp with cholelithiasis, no evidence of biliary obstruction. We will proceed with elective laparoscopic cholecystectomy, robotic assist.  Risks, benefits, alternatives thoroughly reviewed and accepted by Maurice Claros, including bleeding, infection, bowel/bile duct injury, COVID-19 exposure/infection, etc.  She conveys clear understanding and is in agreement.      Kiko Oneal MD

## 2021-08-11 NOTE — ANESTHESIA POSTPROCEDURE EVALUATION
Department of Anesthesiology  Postprocedure Note    Patient: Jose Alejandro Manzo  MRN: [id number]  YOB: 1950  Date of evaluation: 8/11/2021  Time:  2:59 PM     Procedure Summary     Date: 08/11/21 Room / Location: [address]    Anesthesia Start: 1140 Anesthesia Stop: 5464    Procedure: CHOLECYSTECTOMY LAPAROSCOPIC ROBOTIC (N/A ) Diagnosis: (CHOLELITHIASIS, SLUDGE)    Surgeons: Mary Ortega MD Responsible Provider:     Anesthesia Type: general ASA Status: 2          Anesthesia Type: general    Ana Phase I: Ana Score: 9    Ana Phase II: Ana Score: 10    Last vitals: Reviewed and per EMR flowsheets.        Anesthesia Post Evaluation

## 2021-08-11 NOTE — PROGRESS NOTES
Janice Acosta MD  General Surgery, Endoscopy  Chief Medical Officer    [facility] Samantha Bladimir  [address] [facility] [address]  Dept: [phone]  Fax: [address]  Chief Complaint   Patient presents with    Suture / Staple Removal     Presents for staple removal. Appendectomy 6/22. Patient denies drainage or pain        HPI    Sister Joi Walker for follow-up after appendectomy. DATE OF PROCEDURE:  06/22/2021     ATTENDING SURGEON:  Dr. Bobo Little.     PCP:  Lewis Ramirez MD.     PREOPERATIVE DIAGNOSIS:  Acute appendicitis.     POSTOPERATIVE DIAGNOSIS:  Acute appendicitis.     OPERATION PERFORMED:  Laparoscopic appendectomy    She is doing very well. Tolerating diet. Original discomfort has resolved. No nausea, vomiting. No fevers nor chills. Original evaluation June 22, 2021. ... 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 Initially generalized, then localized to the right lower quadrant. Fever of 103 at home, 101 on admission. Vital signs stable. Nauseated without emesis. No recent sick contacts nor travel. ER work-up showing WBC 15, H/H 14/42, LFTs and pancreatic enzymes normal, UA negative. CT scan abdomen pelvis confirming acute appendicitis without rupture. Incidental finding of 1 cm soft tissue density in the dependent portion of the gallbladder fundus. No recent weight changes, 168 pounds, BMI 25. No prior abdominal surgery. Colonoscopies with benign polypectomies 2006, 2009, 2015. Patient has never smoked. At this time, she is resting comfortably. Pain is still present but improved. No new complaints. Review of Systems   Constitutional: Negative for activity change, appetite change, chills, fever and unexpected weight change.    HENT: Negative for nosebleeds, sneezing, sore throat and trouble swallowing. Eyes: Negative for visual disturbance. Respiratory: Negative for cough, choking and shortness of breath. Cardiovascular: Negative for chest pain, palpitations and leg swelling. Gastrointestinal: Negative for abdominal pain, blood in stool, nausea and vomiting. Genitourinary: Negative for dysuria, flank pain and hematuria. Musculoskeletal: Positive for arthralgias. Negative for back pain, gait problem and myalgias. Allergic/Immunologic: Negative for immunocompromised state. Neurological: Positive for numbness. Negative for dizziness, seizures, syncope, weakness and headaches. Hematological: Does not bruise/bleed easily. Psychiatric/Behavioral: Negative for confusion and sleep disturbance. Past Medical History:   Diagnosis Date    Asymptomatic varicose veins     Hyperlipidemia     Hypertension     Hypothyroidism     Neuropathy     Tremor        Past Surgical History:   Procedure Laterality Date    ACHILLES TENDON SURGERY  2005    rupture repair    COLONOSCOPY  2006    2 benign polyps    COLONOSCOPY  2009    no polyps this time    COLONOSCOPY  2015    Dr. Shoaib Humphries - hemorrhoids, no polyps    LAPAROSCOPIC APPENDECTOMY N/A 2021    Dr Lizzette Lang   [address]    nodule removed        Family History   Problem Relation Age of Onset    High Cholesterol Mother     High Blood Pressure Mother     Alzheimer's Disease Mother     High Cholesterol Brother     Diabetes Father 76    COPD Father          at 80 from COPD complications    Alzheimer's Disease Maternal Grandfather        Allergies:  See list    No current facility-administered medications for this visit. No current outpatient medications on file.      Facility-Administered Medications Ordered in Other Visits   Medication Dose Route Frequency Provider Last Rate Last Admin    lactated ringers infusion   Intravenous Continuous Yonas Zavala  mL/hr at 21 0908 New Bag at 08/11/21 0908    sodium chloride flush 0.9 % injection 5-40 mL  5-40 mL Intravenous 2 times per day Artemio Roberson MD        sodium chloride flush 0.9 % injection 5-40 mL  5-40 mL Intravenous PRN Artemio Roberson MD        0.9 % sodium chloride infusion  25 mL Intravenous PRN Artemio Roberson MD        ceFAZolin (ANCEF) 2000 mg in dextrose 5 % 100 mL IVPB  2,000 mg Intravenous On Call to [address] [place] [address] Be Garcia MD        ropivacaine (NAROPIN) 0.5% injection   Spinal/Regional PRN Gladies Glendale, APRN - CRNA   45 mL at 08/11/21 0945    dexamethasone (DECADRON) injection   Other PRN Gladies Sera, APRN - CRNA   10 mg at 08/11/21 0945    midazolam (VERSED) injection   Intravenous PRN Gladies Glendale, APRN - CRNA   2 mg at 08/11/21 0935       Social History     Socioeconomic History    Marital status: Single     Spouse name: None    Number of children: None    Years of education: None    Highest education level: None   Occupational History    None   Tobacco Use    Smoking status: Never Smoker    Smokeless tobacco: Never Used   Substance and Sexual Activity    Alcohol use: No     Alcohol/week: 0.0 standard drinks    Drug use: Never    Sexual activity: None   Other Topics Concern    None   Social History Narrative    None     Social Determinants of Health     Financial Resource Strain: Low Risk     Difficulty of Paying Living Expenses: Not hard at all   Food Insecurity: No Food Insecurity    Worried About Running Out of Food in the Last Year: Never true    William of Food in the Last Year: Never true   Transportation Needs:     Lack of Transportation (Medical):      Lack of Transportation (Non-Medical):    Physical Activity:     Days of Exercise per Week:     Minutes of Exercise per Session:    Stress:     Feeling of Stress :    Social Connections:     Frequency of Communication with Friends and Family:     Frequency of Social Gatherings with Friends and Family:     Attends [employer] Services:     Active Member of Clubs or Organizations:     Attends Club or Organization Meetings:     Marital Status:    Intimate Partner Violence:     Fear of Current or Ex-Partner:     Emotionally Abused:     Physically Abused:     Sexually Abused:        /71 (Site: Left Upper Arm, Position: Sitting, Cuff Size: Large Adult)   Pulse 85   Temp 97.4 °F (36.3 °C)   Resp 18   Ht 5' 9\" (1.753 m)   Wt 162 lb (73.5 kg)   SpO2 95%   BMI 23.92 kg/m²      Physical Exam  Vitals and nursing note reviewed. Constitutional:       Appearance: She is well-developed. HENT:      Head: Normocephalic and atraumatic. Eyes:      General: No scleral icterus. Conjunctiva/sclera: Conjunctivae normal.      Pupils: Pupils are equal, round, and reactive to light. Neck:      Vascular: No JVD. Trachea: No tracheal deviation. Cardiovascular:      Rate and Rhythm: Normal rate and regular rhythm. Pulmonary:      Effort: Pulmonary effort is normal. No respiratory distress. Chest:      Chest wall: No tenderness. Abdominal:      General: There is no distension. Palpations: Abdomen is soft. There is no mass. Tenderness: There is no abdominal tenderness. There is no guarding or rebound. Comments: Abdominal incisions are clean, dry, intact. No erythema nor other signs of infection. Musculoskeletal:         General: Normal range of motion. Cervical back: Normal range of motion and neck supple. Lymphadenopathy:      Cervical: No cervical adenopathy. Skin:     General: Skin is warm and dry. Findings: No erythema or rash. Neurological:      Mental Status: She is alert and oriented to person, place, and time. Cranial Nerves: No cranial nerve deficit. Psychiatric:         Behavior: Behavior normal.         Thought Content:  Thought content normal.         Judgment: Judgment normal.       IMAGING/LABS    6/23/2021  2:08 PM - Saqib, Tres Incoming Lab Results From Incoming Lab Results From Hernan Malloy MD Results    6/23/2021  2:08 PM Saqib, Mhpn Incoming Lab Results From Trish Adams MD [facility] Results   Click to Print Result   [facility] Info    Surgical Pathology (Order #[id number]) on 6/22/21     US GALLBLADDER RUQ  Status: Final result   Order Providers    Authorizing Encounter Billing   Dorothea Hu MD [facility] ULTRASOUND ROOM [facility] DO          Signed by    Signed Date/Time Phone Pager   Carole Paul 6/30/2021 11:35 [phone]    Reading Providers    Read Date Phone Pager   Carole Paul Wed Jun 30, 2021 11:35 [phone]    All Reviewers List    Deb Bloom MD on 6/30/2021 13:51   Routing History    Priority Sent On From To Message Type    6/30/2021 11:38 AM Saqib, Mhpn Incoming Radiant Results From [address] Ilsa Gan MD Results    6/30/2021 11:38 AM Saqib, pn Incoming Radiant Results From [address], MD CC'd Results   Radiation Dose Estimates    No radiation information found for this patient   Narrative   EXAMINATION:   RIGHT UPPER QUADRANT ULTRASOUND       6/30/2021 10:49 am       COMPARISON:   CT abdomen and pelvis June 21, 2021.       HISTORY:   ORDERING SYSTEM PROVIDED HISTORY: Abdominal pain, unspecified abdominal   location   TECHNOLOGIST PROVIDED HISTORY:       abnormal CT abd/pelvis June 21, 2021, acute appendicits, incidental finding   of 1 cm soft tissue density gallbladder fundus       FINDINGS:   LIVER:  The liver demonstrates normal echogenicity without evidence of   intrahepatic biliary ductal dilatation.       BILIARY SYSTEM:  Polyp seen within the gallbladder lumen measuring 1 cm.    There is associated internal color Doppler vascularity.  Sludge and stones   are also seen within the gallbladder lumen.  No gallbladder wall thickening   or pericholecystic fluid.  Negative Spence's sign.       Common bile duct is within normal limits measuring 3. 3 mm.       RIGHT KIDNEY: The right kidney is grossly unremarkable without evidence of   hydronephrosis.       PANCREAS:  Visualized portions of the pancreas are unremarkable.       OTHER: No evidence of right upper quadrant ascites.           Impression   1. 1 cm polyp involving the gallbladder.  Surgical consultation is   recommended. 2. Sludge/stones seen within the gallbladder lumen without ultrasound   evidence of acute cholecystitis.           ASSESSMENT     Diagnosis Orders   1. S/P laparoscopic appendectomy     2. Acute gangrenous appendicitis     3. Abnormal CT of the abdomen     4. Gallbladder polyp     5. Cholelithiasis without cholecystitis     6. BMI 24.0-24.9, adult         PLAN    Sister Estelle Costa is doing very well following laparoscopic appendectomy for gangrenous appendicitis with microperforation. Tolerating diet, GI function has returned. Discussed abnormal CT and ultrasound findings showing a 1 cm gallbladder polyp with cholelithiasis, no evidence of biliary obstruction. We will proceed with elective laparoscopic cholecystectomy, robotic assist.  Risks, benefits, alternatives thoroughly reviewed and accepted by Brionna Escobar, including bleeding, infection, bowel/bile duct injury, COVID-19 exposure/infection, etc.  She conveys clear understanding and is in agreement.      Amber George MD

## 2021-08-13 LAB — SURGICAL PATHOLOGY REPORT: NORMAL

## 2021-08-20 ENCOUNTER — OFFICE VISIT (OUTPATIENT)
Dept: SURGERY | Age: 71
End: 2021-08-20
Payer: MEDICARE

## 2021-08-20 DIAGNOSIS — Z90.49 S/P LAPAROSCOPIC CHOLECYSTECTOMY: Primary | ICD-10-CM

## 2021-08-20 DIAGNOSIS — Z90.49 S/P LAPAROSCOPIC APPENDECTOMY: ICD-10-CM

## 2021-08-20 DIAGNOSIS — K82.4 GALLBLADDER POLYP: ICD-10-CM

## 2021-08-20 DIAGNOSIS — K80.20 CHOLELITHIASIS WITHOUT CHOLECYSTITIS: ICD-10-CM

## 2021-08-20 DIAGNOSIS — Z12.11 ENCOUNTER FOR SCREENING COLONOSCOPY: ICD-10-CM

## 2021-08-20 PROCEDURE — 99024 POSTOP FOLLOW-UP VISIT: CPT | Performed by: SURGERY

## 2021-08-20 ASSESSMENT — ENCOUNTER SYMPTOMS
COUGH: 0
VOMITING: 0
SHORTNESS OF BREATH: 0
BACK PAIN: 0
SORE THROAT: 0
CHOKING: 0
NAUSEA: 0
ABDOMINAL PAIN: 0
BLOOD IN STOOL: 0
TROUBLE SWALLOWING: 0

## 2021-08-20 NOTE — PATIENT INSTRUCTIONS
Patient Education        Learning About Colonoscopy  What is a colonoscopy? A colonoscopy is a test (also called a procedure) that lets a doctor look inside your large intestine. The doctor uses a thin, lighted tube called a colonoscope. The doctor uses it to look for small growths called polyps, colon or rectal cancer (colorectal cancer), or other problems like bleeding. During the procedure, the doctor can take samples of tissue. The samples can then be checked for cancer or other conditions. The doctor can also take out polyps. How is a colonoscopy done? This procedure is done in a doctor's office or a clinic or hospital. You will get medicine to help you relax and not feel pain. Some people find that they don't remember having the test because of the medicine. The doctor gently moves the colonoscope, or scope, through the colon. The scope is also a small video camera. It lets the doctor see the colon and take pictures. How do you prepare for the procedure? You need to clean out your colon before the procedure so the doctor can see your colon. This depends on which \"colon prep\" your doctor recommends. To clean out your colon, you'll do a \"colon prep\" before the test. This means you stop eating solid foods and drink only clear liquids. You can have water, tea, coffee, clear juices, clear broths, flavored ice pops, and gelatin (such as Jell-O). Do not drink anything red or purple. The day or night before the procedure, you drink a large amount of a special liquid. This causes loose, frequent stools. You will go to the bathroom a lot. Your doctor may have you drink part of the liquid the evening before and the rest on the day of the test. It's very important to drink all of the liquid. If you have problems drinking it, call your doctor. Arrange to have someone take you home after the test.  What can you expect after a colonoscopy?   Your doctor will tell you when you can eat and do your usual activities. Drink a lot of fluid after the test to replace the fluids you may have lost during the colon prep. But don't drink alcohol. Your doctor will talk to you about when you'll need your next colonoscopy. The results of your test and your risk for colorectal cancer will help your doctor decide how often you need to be checked. After the test, you may be bloated or have gas pains. You may need to pass gas. If a biopsy was done or a polyp was removed, you may have streaks of blood in your stool (feces) for a few days. Check with your doctor to see when it is safe to take aspirin and nonsteroidal anti-inflammatory drugs (NSAIDs) again. Problems such as heavy rectal bleeding may not occur until several weeks after the test. This isn't common. But it can happen after polyps are removed. Follow-up care is a key part of your treatment and safety. Be sure to make and go to all appointments, and call your doctor if you are having problems. It's also a good idea to know your test results and keep a list of the medicines you take. Where can you learn more? Go to https://Purplu.CheckBonus. org and sign in to your [employer] account. Enter E898 in the [facility] box to learn more about \"Learning About Colonoscopy. \"     If you do not have an account, please click on the \"Sign Up Now\" link. Current as of: December 17, 2020               Content Version: 12.9  © 8147-8760 Healthwise, Incorporated. Care instructions adapted under license by [facility] ([facility]). If you have questions about a medical condition or this instruction, always ask your healthcare professional. [place] [address] any warranty or liability for your use of this information.

## 2021-08-20 NOTE — PROGRESS NOTES
Arielle Yan MD  General Surgery, Endoscopy  Chief Medical Officer    [facility] Agustín Castillo  [address] [facility] [address]  Dept: [phone]  Fax: [address]  Chief Complaint   Patient presents with    Post-Op Check     s/p lap colt 8/11. Pt states she isn't having any issues. HPI    Sister Zane Patricia returns for follow-up after cholecystectomy. DATE OF PROCEDURE:  08/11/2021     ATTENDING SURGEON:  Dr. Richie Payton.     PCP:  Stephanie Eller MD.     POSTOPERATIVE DIAGNOSES:  1.  Gallbladder polyp. 2.  Cholelithiasis.     POSTOPERATIVE DIAGNOSIS:  Cholelithiasis.     OPERATION PERFORMED:  Laparoscopic cholecystectomy, robotic assist    She is doing very well. No pain. Tolerating diet without difficulty. No fevers nor chills. Screening colonoscopy is planned for October 2021. Original evaluation June 22, 2021. Vibha Garcia .     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  Initially generalized, then localized to the right lower quadrant.  Fever of 103 at home, 101 on admission.  Vital signs stable.  Nauseated without emesis.  No recent sick contacts nor travel.  ER work-up showing WBC 15, H/H 14/42, LFTs and pancreatic enzymes normal, UA negative.  CT scan abdomen pelvis confirming acute appendicitis without rupture.  Incidental finding of 1 cm soft tissue density in the dependent portion of the gallbladder fundus.  No recent weight changes, 168 pounds, BMI 25.  No prior abdominal surgery.  Colonoscopies with benign polypectomies 2006, 2009, 2015. Fannie Chicas has never smoked.  At this time, she is resting comfortably. Yane Dent is still present but improved.  No new complaints. Review of Systems   Constitutional: Negative for activity change, appetite change, chills, fever and unexpected weight change.    HENT: Negative for DAY 90 tablet 3    pregabalin (LYRICA) 100 MG capsule       potassium chloride (KLOR-CON M) 20 MEQ extended release tablet Take 1 tablet by mouth daily 30 tablet 5    meloxicam (MOBIC) 7.5 MG tablet TAKE 1 TABLET BY MOUTH DAILY AS NEEDED FOR PAIN ((START AFTER PREDNISONE TAPER IS COMPLETED)) 30 tablet 5    triamterene-hydroCHLOROthiazide (DYAZIDE) 37.5-25 MG per capsule Take 1 capsule by mouth every morning / pt requests 30 day & [facility] 30 capsule 11    zonisamide (ZONEGRAN) 25 MG capsule Take 25 mg by mouth daily      pregabalin (LYRICA) 75 MG capsule Take 75 mg by mouth 2 times daily.  magnesium oxide (MAG-OX) 400 MG tablet Take 400 mg by mouth daily      Alpha-Lipoic Acid 600 MG CAPS Take by mouth 2 times daily      Biotin 2500 MCG CAPS Take by mouth 2 times daily      loratadine (CLARITIN) 10 MG capsule Take 10 mg by mouth daily      b complex vitamins capsule Take 1 capsule by mouth daily      Cholecalciferol (VITAMIN D-3 PO) Take by mouth daily      Multiple Vitamins-Minerals (MULTIVITAMIN & MINERAL PO) Take by mouth       No current facility-administered medications for this visit.        Social History     Socioeconomic History    Marital status: Single     Spouse name: Not on file    Number of children: Not on file    Years of education: Not on file    Highest education level: Not on file   Occupational History    Not on file   Tobacco Use    Smoking status: Never Smoker    Smokeless tobacco: Never Used   Substance and Sexual Activity    Alcohol use: No     Alcohol/week: 0.0 standard drinks    Drug use: Never    Sexual activity: Not on file   Other Topics Concern    Not on file   Social History Narrative    Not on file     Social Determinants of Health     Financial Resource Strain: Low Risk     Difficulty of Paying Living Expenses: Not hard at all   Food Insecurity: No Food Insecurity    Worried About [address] in the Last Year: Never true    [address] in the [employer]    Component Collected Lab   Surgical Pathology Report 08/11/2021  9:50  [address]   -- Diagnosis --   GALLBLADDER:        -CHRONIC CHOLECYSTITIS.      -CHOLELITHIASIS.      -CHOLESTEROLOSIS (CHOLESTEROL POLYP). Miryam Collins M.D.   **Electronically Signed Out**         [facility]/8/13/2021         Clinical Information   Pre-op Diagnosis:  CHOLELITHIASIS, SLUDGE   Operative Findings:  [id number]   Operation Performed:  LAPAROSCOPIC CHOLECYSTECTOMY     Source of Specimen   1: GALLBLADDER     Gross Description   \"GALEN OROSCOANEK, GALLBLADDER\" 4.0 x 2.0 x 2.0 cm previously incised   gallbladder with a 1.0 cm long x 0.4 cm in diameter cystic duct.  The   serosa is green-gray, and the liver bed is coarse brown-green.  The   wall is 0.1 cm in thickness and the mucosa is green and velvety. Within the specimen container are gritty brown-black calculi, 2.0 x   1.5 x 0.5 cm in aggregate.  Also, there is a 0.5 cm green-tan polypoid   fragment.  Cystic duct margin, sections of gallbladder mucosa and   separate polypoid fragments 1cs.  tm       Microscopic Description   Microscopic examination performed. SURGICAL PATHOLOGY CONSULTATION         Patient Name: Jerardo Barrow: [id number]   Path Number: [id number]     [address], [facility] [address]. [place], [address]   [phone]   Fax: [phone]    Testing Performed By    Mikey Canas Name Director Address Valid Date Range   [facility] [address] [place] [address] Drive [address] 08/30/17 0801-Present   Lab and Collection    Surgical Pathology - 8/12/2021      ASSESSMENT     Diagnosis Orders   1. S/P laparoscopic cholecystectomy     2. Gallbladder polyp     3. Cholelithiasis without cholecystitis     4. S/P laparoscopic appendectomy     5. Encounter for screening colonoscopy     6.  BMI 24.0-24.9, adult         PLAN    Sister Leda Cornelius is doing well. No pain. Final pathology shows gallstones with small cholesterol polyp. Original abnormality on imaging showing potential 1 cm gallbladder polyp was actually a small overlying segment of normal liver tissue. Continue gradual advancement of diet and activity as tolerated with no heavy lifting nor abdominal straining for the next few weeks. Follow-up with me for screening colonoscopy in October.      Lavern Archibald MD

## 2021-08-20 NOTE — LETTER
[facility] Part of [place] [address]  Phone: [phone]  Fax: [phone]    Alexander Sanchez MD    August 20, 2021     [address] [id number]    Patient: Amanda Riggins   MR Number: [id number]   YOB: 1950   Date of Visit: 8/20/2021       Dear Kourtney Masters: Thank you for referring Kay Florentino to me for evaluation/treatment. Below are the relevant portions of my assessment and plan of care. ASSESSMENT     Diagnosis Orders   1. S/P laparoscopic cholecystectomy     2. Gallbladder polyp     3. Cholelithiasis without cholecystitis     4. S/P laparoscopic appendectomy     5. Encounter for screening colonoscopy     6. BMI 24.0-24.9, adult         PLAN    Sister Kay Florentino is doing well. No pain. Final pathology shows gallstones with small cholesterol polyp. Original abnormality on imaging showing potential 1 cm gallbladder polyp was actually a small overlying segment of normal liver tissue. Continue gradual advancement of diet and activity as tolerated with no heavy lifting nor abdominal straining for the next few weeks. Follow-up with me for screening colonoscopy in October. If you have questions, please do not hesitate to call me. I look forward to following Shakeel Schultz along with you.     Sincerely,    MD Alexander Chávez MD

## 2021-09-29 ENCOUNTER — HOSPITAL ENCOUNTER (OUTPATIENT)
Age: 71
Discharge: HOME | End: 2021-09-29
Payer: MEDICARE

## 2021-09-29 DIAGNOSIS — E78.2 MIXED HYPERLIPIDEMIA: ICD-10-CM

## 2021-09-29 DIAGNOSIS — E03.9 HYPOTHYROIDISM, UNSPECIFIED TYPE: ICD-10-CM

## 2021-09-29 DIAGNOSIS — I10 HYPERTENSION, UNSPECIFIED TYPE: ICD-10-CM

## 2021-09-29 LAB
ALT SERPL-CCNC: 19 U/L (ref 5–33)
ANION GAP SERPL CALCULATED.3IONS-SCNC: 10 MMOL/L (ref 9–17)
AST SERPL-CCNC: 24 U/L
BUN BLDV-MCNC: 19 MG/DL (ref 8–23)
BUN/CREAT BLD: 25 (ref 9–20)
CALCIUM SERPL-MCNC: 9.4 MG/DL (ref 8.6–10.4)
CHLORIDE BLD-SCNC: 105 MMOL/L (ref 98–107)
CHOLESTEROL/HDL RATIO: 3.3
CHOLESTEROL: 160 MG/DL
CO2: 27 MMOL/L (ref 20–31)
CREAT SERPL-MCNC: 0.77 MG/DL (ref 0.5–0.9)
GFR AFRICAN AMERICAN: >60 ML/MIN
GFR NON-AFRICAN AMERICAN: >60 ML/MIN
GFR SERPL CREATININE-BSD FRML MDRD: ABNORMAL ML/MIN/{1.73_M2}
GFR SERPL CREATININE-BSD FRML MDRD: ABNORMAL ML/MIN/{1.73_M2}
GLUCOSE BLD-MCNC: 84 MG/DL (ref 70–99)
HCT VFR BLD CALC: 41.2 % (ref 36.3–47.1)
HDLC SERPL-MCNC: 48 MG/DL
HEMOGLOBIN: 13.2 G/DL (ref 11.9–15.1)
LDL CHOLESTEROL: 93 MG/DL (ref 0–130)
MCH RBC QN AUTO: 29.7 PG (ref 25.2–33.5)
MCHC RBC AUTO-ENTMCNC: 32 G/DL (ref 28.4–34.8)
MCV RBC AUTO: 92.6 FL (ref 82.6–102.9)
NRBC AUTOMATED: 0 PER 100 WBC
PDW BLD-RTO: 13.1 % (ref 11.8–14.4)
PLATELET # BLD: 227 K/UL (ref 138–453)
PMV BLD AUTO: 11.3 FL (ref 8.1–13.5)
POTASSIUM SERPL-SCNC: 3.7 MMOL/L (ref 3.7–5.3)
RBC # BLD: 4.45 M/UL (ref 3.95–5.11)
SODIUM BLD-SCNC: 142 MMOL/L (ref 135–144)
T3 FREE: 2.98 PG/ML (ref 2.02–4.43)
THYROXINE, FREE: 1.58 NG/DL (ref 0.93–1.7)
TRIGL SERPL-MCNC: 93 MG/DL
TSH SERPL DL<=0.05 MIU/L-ACNC: 2.98 MIU/L (ref 0.3–5)
VLDLC SERPL CALC-MCNC: NORMAL MG/DL (ref 1–30)
WBC # BLD: 4.7 K/UL (ref 3.5–11.3)

## 2021-09-29 PROCEDURE — 84460 ALANINE AMINO (ALT) (SGPT): CPT

## 2021-09-29 PROCEDURE — 36415 COLL VENOUS BLD VENIPUNCTURE: CPT

## 2021-09-29 PROCEDURE — 84439 ASSAY OF FREE THYROXINE: CPT

## 2021-09-29 PROCEDURE — 85027 COMPLETE CBC AUTOMATED: CPT

## 2021-09-29 PROCEDURE — 84450 TRANSFERASE (AST) (SGOT): CPT

## 2021-09-29 PROCEDURE — 84443 ASSAY THYROID STIM HORMONE: CPT

## 2021-09-29 PROCEDURE — 84481 FREE ASSAY (FT-3): CPT

## 2021-09-29 PROCEDURE — 80048 BASIC METABOLIC PNL TOTAL CA: CPT

## 2021-09-29 PROCEDURE — 80061 LIPID PANEL: CPT

## 2021-10-19 ENCOUNTER — OFFICE VISIT (OUTPATIENT)
Dept: SURGERY | Age: 71
End: 2021-10-19
Payer: MEDICARE

## 2021-10-19 VITALS
HEART RATE: 89 BPM | DIASTOLIC BLOOD PRESSURE: 73 MMHG | HEIGHT: 69 IN | BODY MASS INDEX: 24.79 KG/M2 | SYSTOLIC BLOOD PRESSURE: 116 MMHG | WEIGHT: 167.4 LBS | TEMPERATURE: 98.3 F | RESPIRATION RATE: 18 BRPM

## 2021-10-19 DIAGNOSIS — Z90.49 S/P LAPAROSCOPIC CHOLECYSTECTOMY: ICD-10-CM

## 2021-10-19 DIAGNOSIS — Z12.11 ENCOUNTER FOR SCREENING COLONOSCOPY: Primary | ICD-10-CM

## 2021-10-19 DIAGNOSIS — K62.5 RECTAL BLEEDING: ICD-10-CM

## 2021-10-19 DIAGNOSIS — Z87.19 HISTORY OF HEMORRHOIDS: ICD-10-CM

## 2021-10-19 DIAGNOSIS — Z86.010 HISTORY OF COLON POLYPS: ICD-10-CM

## 2021-10-19 PROCEDURE — 99213 OFFICE O/P EST LOW 20 MIN: CPT | Performed by: SURGERY

## 2021-10-19 RX ORDER — SODIUM, POTASSIUM,MAG SULFATES 17.5-3.13G
1 SOLUTION, RECONSTITUTED, ORAL ORAL ONCE
Qty: 1 EACH | Refills: 0 | Status: SHIPPED | OUTPATIENT
Start: 2021-10-19 | End: 2021-10-19

## 2021-10-19 ASSESSMENT — ENCOUNTER SYMPTOMS
ABDOMINAL PAIN: 0
SHORTNESS OF BREATH: 0
CHOKING: 0
NAUSEA: 0
TROUBLE SWALLOWING: 0
SORE THROAT: 0
ANAL BLEEDING: 1
BACK PAIN: 0
VOMITING: 0
COUGH: 0
BLOOD IN STOOL: 0

## 2021-10-19 NOTE — LETTER
[facility] SURGERY Part of [place] [address]  Phone: [phone]  Fax: [phone]    Taye Vincent MD    October 19, 2021     [address] [id number]    Patient: Malu Brand   MR Number: [id number]   YOB: 1950   Date of Visit: 10/19/2021       Dear Gordon Pelayo: Thank you for referring Jackie Ng to me for evaluation/treatment. Below are the relevant portions of my assessment and plan of care. ASSESSMENT     Diagnosis Orders   1. Encounter for screening colonoscopy     2. History of colon polyps     3. History of hemorrhoids     4. Rectal bleeding     5. S/P laparoscopic cholecystectomy     6. BMI 24.0-24.9, adult       PLAN    Discussed with Sister Jackie Ng her history of benign colon polyps 15 to 20 years ago with most recent colonoscopy 2015 showing no polyps at that time. She has occasional rectal bleeding following straining, history of hemorrhoids. We will proceed with screening colonoscopy. Risks, benefits, alternatives thoroughly reviewed and accepted by Joselin Soto including GI bleeding, perforation, missed lesions, COVID-19 exposure/infection, etc.  Discussed importance of a healthy, balanced high-fiber low-fat diet with fiber supplementation, increased water intake, physical activity, etc.     If you have questions, please do not hesitate to call me. I look forward to following Ronaldo Fair along with you.     Sincerely,      Taye Vincent MD

## 2021-10-19 NOTE — PROGRESS NOTES
Manuel Eng MD  General Surgery, Endoscopy  Chief Medical Officer    [facility] Obdulia Diaz  [address] [address]  Dept: [phone]  Fax: [address]  Chief Complaint   Patient presents with    Colonoscopy     Colonoscopy consult. Last colonoscopy was 5 years ago with Dr. Waldo London. Everything was normal. Patient states that when she has a BM she does have bright red blood spots. HPI    Sister Rhiannon Bunn presents for screening colonoscopy. She has a history of colon polyps 15 to 20 years ago. Most recent colonoscopy 2015 without polyps. No abdominal pain. Normal appetite. No epigastric pain nor reflux symptoms. Occasional rectal bleeding on toilet tissue which she attributes to hemorrhoids. No cough, fever nor other respiratory symptoms. No history of COVID-19, vaccination schedule complete. No recent weight changes, 167 pounds, BMI 25. She is status post cholecystectomy and appendectomy. No family history of colon cancer to her knowledge. She has never smoked. Review of Systems   Constitutional: Negative for activity change, appetite change, chills, fever and unexpected weight change. HENT: Negative for nosebleeds, sneezing, sore throat and trouble swallowing. Eyes: Negative for visual disturbance. Respiratory: Negative for cough, choking and shortness of breath. Cardiovascular: Negative for chest pain, palpitations and leg swelling. Gastrointestinal: Positive for anal bleeding. Negative for abdominal pain, blood in stool, nausea and vomiting. Genitourinary: Negative for dysuria, flank pain and hematuria. Musculoskeletal: Positive for arthralgias. Negative for back pain, gait problem and myalgias. Allergic/Immunologic: Negative for immunocompromised state. Neurological: Positive for tremors. Negative for dizziness, seizures, syncope, weakness and headaches. Hematological: Does not bruise/bleed easily. Psychiatric/Behavioral: Negative for confusion and sleep disturbance.        Past Medical History:   Diagnosis Date    Asymptomatic varicose veins     Hyperlipidemia     Hypertension     Hypothyroidism     Neuropathy     Tremor        Past Surgical History:   Procedure Laterality Date    ACHILLES TENDON SURGERY  2005    rupture repair    CHOLECYSTECTOMY, LAPAROSCOPIC N/A 2021    CHOLECYSTECTOMY LAPAROSCOPIC ROBOTIC performed by [facility]io [facility] at [facility]  2006    2 benign polyps    COLONOSCOPY      no polyps this time    COLONOSCOPY  2015    Dr. Myra Guardado - hemorrhoids, no polyps    LAPAROSCOPIC APPENDECTOMY N/A 2021    Dr Flores Davenport   [address]    nodule removed        Family History   Problem Relation Age of Onset    High Cholesterol Mother     High Blood Pressure Mother     Alzheimer's Disease Mother     High Cholesterol Brother     Diabetes Father 76    COPD Father          at 80 from COPD complications    Alzheimer's Disease Maternal Grandfather        Allergies:  See list    Current Outpatient Medications   Medication Sig Dispense Refill    triamterene-hydroCHLOROthiazide (DYAZIDE) 37.5-25 MG per capsule Take 1 capsule by mouth every morning / pt requests 30 day & [facility] 30 capsule 11    levothyroxine (SYNTHROID) 75 MCG tablet TAKE 1 TABLET BY MOUTH EVERY DAY 90 tablet 3    atorvastatin (LIPITOR) 20 MG tablet TAKE 1 TABLET BY MOUTH EVERY DAY 90 tablet 3    pregabalin (LYRICA) 100 MG capsule       potassium chloride (KLOR-CON M) 20 MEQ extended release tablet Take 1 tablet by mouth daily 30 tablet 5    meloxicam (MOBIC) 7.5 MG tablet TAKE 1 TABLET BY MOUTH DAILY AS NEEDED FOR PAIN ((START AFTER PREDNISONE TAPER IS COMPLETED)) 30 tablet 5    zonisamide (ZONEGRAN) 25 MG capsule Take 25 mg by mouth daily      magnesium oxide (MAG-OX) 400 MG tablet Take 400 mg by mouth daily      Alpha-Lipoic Acid 600 MG CAPS Take by mouth 2 times daily  Biotin 2500 MCG CAPS Take by mouth 2 times daily      loratadine (CLARITIN) 10 MG capsule Take 10 mg by mouth daily      b complex vitamins capsule Take 1 capsule by mouth daily      Multiple Vitamins-Minerals (MULTIVITAMIN & MINERAL PO) Take by mouth      pregabalin (LYRICA) 75 MG capsule Take 75 mg by mouth 2 times daily. (Patient not taking: Reported on 10/8/2021)      Cholecalciferol (VITAMIN D-3 PO) Take by mouth daily Only during the winter (Patient not taking: Reported on 10/19/2021)       No current facility-administered medications for this visit. Social History     Socioeconomic History    Marital status: Single     Spouse name: None    Number of children: None    Years of education: None    Highest education level: None   Occupational History    None   Tobacco Use    Smoking status: Never Smoker    Smokeless tobacco: Never Used   Substance and Sexual Activity    Alcohol use: No     Alcohol/week: 0.0 standard drinks    Drug use: Never    Sexual activity: None   Other Topics Concern    None   Social History Narrative    None     Social Determinants of Health     Financial Resource Strain: Low Risk     Difficulty of Paying Living Expenses: Not hard at all   Food Insecurity: No Food Insecurity    Worried About Running Out of Food in the Last Year: Never true    William of Food in the Last Year: Never true   Transportation Needs:     Lack of Transportation (Medical):      Lack of Transportation (Non-Medical):    Physical Activity:     Days of Exercise per Week:     Minutes of Exercise per Session:    Stress:     Feeling of Stress :    Social Connections:     Frequency of Communication with Friends and Family:     Frequency of Social Gatherings with Friends and Family:     Attends Mu-ism Services:     Active Member of Clubs or Organizations:     Attends Club or Organization Meetings:     Marital Status:    Intimate Partner Violence:     Fear of Current or Ex-Partner:  Emotionally Abused:     Physically Abused:     Sexually Abused:        /73 (Site: Right Upper Arm, Position: Sitting, Cuff Size: Medium Adult)   Pulse 89   Temp 98.3 °F (36.8 °C) (Infrared)   Resp 18   Ht 5' 9\" (1.753 m)   Wt 167 lb 6.4 oz (75.9 kg)   BMI 24.72 kg/m²      Physical Exam  Vitals and nursing note reviewed. Constitutional:       Appearance: She is well-developed. HENT:      Head: Normocephalic and atraumatic. Eyes:      General: No scleral icterus. Conjunctiva/sclera: Conjunctivae normal.      Pupils: Pupils are equal, round, and reactive to light. Neck:      Vascular: No JVD. Trachea: No tracheal deviation. Cardiovascular:      Rate and Rhythm: Normal rate and regular rhythm. Pulmonary:      Effort: Pulmonary effort is normal. No respiratory distress. Breath sounds: Normal breath sounds. Chest:      Chest wall: No tenderness. Abdominal:      General: Bowel sounds are normal. There is no distension. Palpations: Abdomen is soft. There is no mass. Tenderness: There is no abdominal tenderness. There is no guarding or rebound. Musculoskeletal:         General: Normal range of motion. Cervical back: Normal range of motion and neck supple. Lymphadenopathy:      Cervical: No cervical adenopathy. Skin:     General: Skin is warm and dry. Findings: No erythema or rash. Neurological:      Mental Status: She is alert and oriented to person, place, and time. Cranial Nerves: No cranial nerve deficit. Psychiatric:         Behavior: Behavior normal.         Thought Content: Thought content normal.         Judgment: Judgment normal.         ASSESSMENT     Diagnosis Orders   1. Encounter for screening colonoscopy     2. History of colon polyps     3. History of hemorrhoids     4. Rectal bleeding     5. S/P laparoscopic cholecystectomy     6.  BMI 24.0-24.9, adult       PLAN    Discussed with Sister Bryon Jett her history of benign colon polyps 15 to 20 years ago with most recent colonoscopy 2015 showing no polyps at that time. She has occasional rectal bleeding following straining, history of hemorrhoids. We will proceed with screening colonoscopy.   Risks, benefits, alternatives thoroughly reviewed and accepted by Poornima Leach including GI bleeding, perforation, missed lesions, COVID-19 exposure/infection, etc.  Discussed importance of a healthy, balanced high-fiber low-fat diet with fiber supplementation, increased water intake, physical activity, etc.     Keyona José MD

## 2021-11-11 ENCOUNTER — HOSPITAL ENCOUNTER (OUTPATIENT)
Age: 71
Discharge: HOME | End: 2021-11-11
Payer: MEDICARE

## 2021-11-11 DIAGNOSIS — E87.6 POTASSIUM SERUM DECREASED: ICD-10-CM

## 2021-11-11 LAB
ANION GAP SERPL CALCULATED.3IONS-SCNC: 10 MMOL/L (ref 9–17)
BUN BLDV-MCNC: 25 MG/DL (ref 8–23)
BUN/CREAT BLD: 23 (ref 9–20)
CALCIUM SERPL-MCNC: 9.4 MG/DL (ref 8.6–10.4)
CHLORIDE BLD-SCNC: 100 MMOL/L (ref 98–107)
CO2: 29 MMOL/L (ref 20–31)
CREAT SERPL-MCNC: 1.09 MG/DL (ref 0.5–0.9)
GFR AFRICAN AMERICAN: >60 ML/MIN
GFR NON-AFRICAN AMERICAN: 50 ML/MIN
GFR SERPL CREATININE-BSD FRML MDRD: ABNORMAL ML/MIN/{1.73_M2}
GFR SERPL CREATININE-BSD FRML MDRD: ABNORMAL ML/MIN/{1.73_M2}
GLUCOSE BLD-MCNC: 77 MG/DL (ref 70–99)
POTASSIUM SERPL-SCNC: 4.1 MMOL/L (ref 3.7–5.3)
SODIUM BLD-SCNC: 139 MMOL/L (ref 135–144)

## 2021-11-11 PROCEDURE — 80048 BASIC METABOLIC PNL TOTAL CA: CPT

## 2021-11-11 PROCEDURE — 36415 COLL VENOUS BLD VENIPUNCTURE: CPT

## 2021-12-05 NOTE — H&P
HPI     Sister Shekhar Pineda presents for screening colonoscopy. She has a history of colon polyps 15 to 20 years ago. Most recent colonoscopy 2015 without polyps. No abdominal pain. Normal appetite. No epigastric pain nor reflux symptoms. Occasional rectal bleeding on toilet tissue which she attributes to hemorrhoids. No cough, fever nor other respiratory symptoms. No history of COVID-19, vaccination schedule complete. No recent weight changes, 167 pounds, BMI 25. She is status post cholecystectomy and appendectomy. No family history of colon cancer to her knowledge. She has never smoked.     Review of Systems   Constitutional: Negative for activity change, appetite change, chills, fever and unexpected weight change. HENT: Negative for nosebleeds, sneezing, sore throat and trouble swallowing. Eyes: Negative for visual disturbance. Respiratory: Negative for cough, choking and shortness of breath. Cardiovascular: Negative for chest pain, palpitations and leg swelling. Gastrointestinal: Positive for anal bleeding. Negative for abdominal pain, blood in stool, nausea and vomiting. Genitourinary: Negative for dysuria, flank pain and hematuria. Musculoskeletal: Positive for arthralgias. Negative for back pain, gait problem and myalgias. Allergic/Immunologic: Negative for immunocompromised state. Neurological: Positive for tremors. Negative for dizziness, seizures, syncope, weakness and headaches. Hematological: Does not bruise/bleed easily.    Psychiatric/Behavioral: Negative for confusion and sleep disturbance.         Past Medical History        Past Medical History:   Diagnosis Date    Asymptomatic varicose veins      Hyperlipidemia      Hypertension      Hypothyroidism      Neuropathy      Tremor              Past Surgical History         Past Surgical History:   Procedure Laterality Date    ACHILLES TENDON SURGERY   2005     rupture repair    CHOLECYSTECTOMY, LAPAROSCOPIC N/A 2021     CHOLECYSTECTOMY LAPAROSCOPIC ROBOTIC performed by Ngoc Jiang MD at [address] [facility]   2006     2 benign polyps    COLONOSCOPY   2009     no polyps this time    COLONOSCOPY   2015     Dr. Mares Shows - hemorrhoids, no polyps    LAPAROSCOPIC APPENDECTOMY N/A 2021     Dr Anita Mai     nodule removed             Family History         Family History   Problem Relation Age of Onset    High Cholesterol Mother      High Blood Pressure Mother      Alzheimer's Disease Mother      High Cholesterol Brother      Diabetes Father 76    COPD Father            at 80 from COPD complications    Alzheimer's Disease Maternal Grandfather              Allergies:  See list     Current Facility-Administered Medications          Current Outpatient Medications   Medication Sig Dispense Refill    triamterene-hydroCHLOROthiazide (DYAZIDE) 37.5-25 MG per capsule Take 1 capsule by mouth every morning / pt requests 30 day & [facility] 30 capsule 11    levothyroxine (SYNTHROID) 75 MCG tablet TAKE 1 TABLET BY MOUTH EVERY DAY 90 tablet 3    atorvastatin (LIPITOR) 20 MG tablet TAKE 1 TABLET BY MOUTH EVERY DAY 90 tablet 3    pregabalin (LYRICA) 100 MG capsule          potassium chloride (KLOR-CON M) 20 MEQ extended release tablet Take 1 tablet by mouth daily 30 tablet 5    meloxicam (MOBIC) 7.5 MG tablet TAKE 1 TABLET BY MOUTH DAILY AS NEEDED FOR PAIN ((START AFTER PREDNISONE TAPER IS COMPLETED)) 30 tablet 5    zonisamide (ZONEGRAN) 25 MG capsule Take 25 mg by mouth daily        magnesium oxide (MAG-OX) 400 MG tablet Take 400 mg by mouth daily        Alpha-Lipoic Acid 600 MG CAPS Take by mouth 2 times daily        Biotin 2500 MCG CAPS Take by mouth 2 times daily        loratadine (CLARITIN) 10 MG capsule Take 10 mg by mouth daily        b complex vitamins capsule Take 1 capsule by mouth daily        Multiple Vitamins-Minerals (MULTIVITAMIN & MINERAL PO) Take by mouth        pregabalin (LYRICA) 75 MG capsule Take 75 mg by mouth 2 times daily. (Patient not taking: Reported on 10/8/2021)        Cholecalciferol (VITAMIN D-3 PO) Take by mouth daily Only during the winter (Patient not taking: Reported on 10/19/2021)          No current facility-administered medications for this visit.            Social History   Social History            Socioeconomic History    Marital status: Single       Spouse name: None    Number of children: None    Years of education: None    Highest education level: None   Occupational History    None   Tobacco Use    Smoking status: Never Smoker    Smokeless tobacco: Never Used   Substance and Sexual Activity    Alcohol use: No       Alcohol/week: 0.0 standard drinks    Drug use: Never    Sexual activity: None   Other Topics Concern    None   Social History Narrative    None      Social Determinants of Health      Financial Resource Strain: Low Risk     Difficulty of Paying Living Expenses: Not hard at all   Food Insecurity: No Food Insecurity    Worried About Running Out of Food in the Last Year: Never true    William of Food in the Last Year: Never true   Transportation Needs:     Lack of Transportation (Medical):      Lack of Transportation (Non-Medical):    Physical Activity:     Days of Exercise per Week:     Minutes of Exercise per Session:    Stress:     Feeling of Stress :    Social Connections:     Frequency of Communication with Friends and Family:     Frequency of Social Gatherings with Friends and Family:     Attends Pentecostal Services:     Active Member of Clubs or Organizations:     Attends Club or Organization Meetings:     Marital Status:    Intimate Partner Violence:     Fear of Current or Ex-Partner:     Emotionally Abused:     Physically Abused:     Sexually Abused:             /73 (Site: Right Upper Arm, Position: Sitting, Cuff Size: Medium Adult)   Pulse 89   Temp 98.3 °F (36.8 °C) (Infrared)   Resp 18   Ht 5' 9\" (1.753 m)   Wt 167 lb 6.4 oz (75.9 kg)   BMI 24.72 kg/m²       Physical Exam  Vitals and nursing note reviewed. Constitutional:       Appearance: She is well-developed. HENT:      Head: Normocephalic and atraumatic. Eyes:      General: No scleral icterus. Conjunctiva/sclera: Conjunctivae normal.      Pupils: Pupils are equal, round, and reactive to light. Neck:      Vascular: No JVD. Trachea: No tracheal deviation. Cardiovascular:      Rate and Rhythm: Normal rate and regular rhythm. Pulmonary:      Effort: Pulmonary effort is normal. No respiratory distress. Breath sounds: Normal breath sounds. Chest:      Chest wall: No tenderness. Abdominal:      General: Bowel sounds are normal. There is no distension. Palpations: Abdomen is soft. There is no mass. Tenderness: There is no abdominal tenderness. There is no guarding or rebound. Musculoskeletal:         General: Normal range of motion. Cervical back: Normal range of motion and neck supple. Lymphadenopathy:      Cervical: No cervical adenopathy. Skin:     General: Skin is warm and dry. Findings: No erythema or rash. Neurological:      Mental Status: She is alert and oriented to person, place, and time. Cranial Nerves: No cranial nerve deficit. Psychiatric:         Behavior: Behavior normal.         Thought Content: Thought content normal.         Judgment: Judgment normal.            ASSESSMENT       Diagnosis Orders   1. Encounter for screening colonoscopy      2. History of colon polyps      3. History of hemorrhoids      4. Rectal bleeding      5. S/P laparoscopic cholecystectomy      6. BMI 24.0-24.9, adult         PLAN     Previously discussed with Sister Corina Villarreal her history of benign colon polyps 15 to 20 years ago with most recent colonoscopy 2015 showing no polyps at that time. She has occasional rectal bleeding following straining, history of hemorrhoids.     No significant interval changes since evaluation October 2021. We will proceed with screening colonoscopy.   Risks, benefits, alternatives previously reviewed and accepted by Antoine Solitario including GI bleeding, perforation, missed lesions, COVID-19 exposure/infection, etc.      Zeenat Stein MD

## 2021-12-07 ENCOUNTER — ANESTHESIA EVENT (OUTPATIENT)
Dept: OPERATING ROOM | Age: 71
End: 2021-12-07
Payer: MEDICARE

## 2021-12-08 ENCOUNTER — HOSPITAL ENCOUNTER (OUTPATIENT)
Age: 71
Setting detail: OUTPATIENT SURGERY
Discharge: HOME | End: 2021-12-08
Attending: SURGERY | Admitting: SURGERY
Payer: MEDICARE

## 2021-12-08 ENCOUNTER — ANESTHESIA (OUTPATIENT)
Dept: OPERATING ROOM | Age: 71
End: 2021-12-08
Payer: MEDICARE

## 2021-12-08 VITALS
RESPIRATION RATE: 14 BRPM | HEIGHT: 69 IN | HEART RATE: 70 BPM | TEMPERATURE: 97.3 F | WEIGHT: 162 LBS | BODY MASS INDEX: 23.99 KG/M2 | DIASTOLIC BLOOD PRESSURE: 63 MMHG | OXYGEN SATURATION: 96 % | SYSTOLIC BLOOD PRESSURE: 121 MMHG

## 2021-12-08 VITALS
SYSTOLIC BLOOD PRESSURE: 90 MMHG | OXYGEN SATURATION: 96 % | TEMPERATURE: 97.5 F | DIASTOLIC BLOOD PRESSURE: 41 MMHG | RESPIRATION RATE: 22 BRPM

## 2021-12-08 PROBLEM — Z86.010 HISTORY OF COLON POLYPS: Status: ACTIVE | Noted: 2021-12-08

## 2021-12-08 PROBLEM — Z86.0100 HISTORY OF COLON POLYPS: Status: ACTIVE | Noted: 2021-12-08

## 2021-12-08 PROCEDURE — 3609027000 HC COLONOSCOPY: Performed by: SURGERY

## 2021-12-08 PROCEDURE — 3700000000 HC ANESTHESIA ATTENDED CARE: Performed by: SURGERY

## 2021-12-08 PROCEDURE — 3700000001 HC ADD 15 MINUTES (ANESTHESIA): Performed by: SURGERY

## 2021-12-08 PROCEDURE — 6360000002 HC RX W HCPCS: Performed by: NURSE ANESTHETIST, CERTIFIED REGISTERED

## 2021-12-08 PROCEDURE — 2709999900 HC NON-CHARGEABLE SUPPLY: Performed by: SURGERY

## 2021-12-08 PROCEDURE — 2500000003 HC RX 250 WO HCPCS: Performed by: NURSE ANESTHETIST, CERTIFIED REGISTERED

## 2021-12-08 PROCEDURE — 7100000010 HC PHASE II RECOVERY - FIRST 15 MIN: Performed by: SURGERY

## 2021-12-08 PROCEDURE — 2580000003 HC RX 258: Performed by: NURSE ANESTHETIST, CERTIFIED REGISTERED

## 2021-12-08 PROCEDURE — 7100000011 HC PHASE II RECOVERY - ADDTL 15 MIN: Performed by: SURGERY

## 2021-12-08 RX ORDER — SODIUM CHLORIDE 9 MG/ML
25 INJECTION, SOLUTION INTRAVENOUS PRN
Status: DISCONTINUED | OUTPATIENT
Start: 2021-12-08 | End: 2021-12-08 | Stop reason: HOSPADM

## 2021-12-08 RX ORDER — SODIUM CHLORIDE 0.9 % (FLUSH) 0.9 %
5-40 SYRINGE (ML) INJECTION PRN
Status: DISCONTINUED | OUTPATIENT
Start: 2021-12-08 | End: 2021-12-08 | Stop reason: HOSPADM

## 2021-12-08 RX ORDER — SODIUM CHLORIDE, SODIUM LACTATE, POTASSIUM CHLORIDE, CALCIUM CHLORIDE 600; 310; 30; 20 MG/100ML; MG/100ML; MG/100ML; MG/100ML
INJECTION, SOLUTION INTRAVENOUS CONTINUOUS
Status: DISCONTINUED | OUTPATIENT
Start: 2021-12-08 | End: 2021-12-08 | Stop reason: HOSPADM

## 2021-12-08 RX ORDER — LIDOCAINE HYDROCHLORIDE 20 MG/ML
INJECTION, SOLUTION INFILTRATION; PERINEURAL PRN
Status: DISCONTINUED | OUTPATIENT
Start: 2021-12-08 | End: 2021-12-08 | Stop reason: SDUPTHER

## 2021-12-08 RX ORDER — PROPOFOL 10 MG/ML
INJECTION, EMULSION INTRAVENOUS PRN
Status: DISCONTINUED | OUTPATIENT
Start: 2021-12-08 | End: 2021-12-08 | Stop reason: SDUPTHER

## 2021-12-08 RX ORDER — SODIUM CHLORIDE, SODIUM LACTATE, POTASSIUM CHLORIDE, CALCIUM CHLORIDE 600; 310; 30; 20 MG/100ML; MG/100ML; MG/100ML; MG/100ML
INJECTION, SOLUTION INTRAVENOUS ONCE
Status: COMPLETED | OUTPATIENT
Start: 2021-12-08 | End: 2021-12-08

## 2021-12-08 RX ORDER — SODIUM CHLORIDE 0.9 % (FLUSH) 0.9 %
10 SYRINGE (ML) INJECTION PRN
Status: DISCONTINUED | OUTPATIENT
Start: 2021-12-08 | End: 2021-12-08 | Stop reason: HOSPADM

## 2021-12-08 RX ORDER — SODIUM CHLORIDE 0.9 % (FLUSH) 0.9 %
5-40 SYRINGE (ML) INJECTION EVERY 12 HOURS SCHEDULED
Status: DISCONTINUED | OUTPATIENT
Start: 2021-12-08 | End: 2021-12-08 | Stop reason: HOSPADM

## 2021-12-08 RX ORDER — SODIUM CHLORIDE 0.9 % (FLUSH) 0.9 %
10 SYRINGE (ML) INJECTION EVERY 12 HOURS SCHEDULED
Status: DISCONTINUED | OUTPATIENT
Start: 2021-12-08 | End: 2021-12-08 | Stop reason: HOSPADM

## 2021-12-08 RX ORDER — PHENYLEPHRINE HYDROCHLORIDE 10 MG/ML
INJECTION INTRAVENOUS PRN
Status: DISCONTINUED | OUTPATIENT
Start: 2021-12-08 | End: 2021-12-08 | Stop reason: SDUPTHER

## 2021-12-08 RX ORDER — DIMENHYDRINATE 50 MG/1
50 TABLET ORAL ONCE
Status: DISCONTINUED | OUTPATIENT
Start: 2021-12-08 | End: 2021-12-08 | Stop reason: HOSPADM

## 2021-12-08 RX ORDER — ACETAMINOPHEN 325 MG/1
650 TABLET ORAL ONCE
Status: DISCONTINUED | OUTPATIENT
Start: 2021-12-08 | End: 2021-12-08 | Stop reason: HOSPADM

## 2021-12-08 RX ADMIN — PHENYLEPHRINE HYDROCHLORIDE 50 MCG: 10 INJECTION INTRAVENOUS at 12:00

## 2021-12-08 RX ADMIN — LIDOCAINE HYDROCHLORIDE 4 MG: 20 INJECTION, SOLUTION INFILTRATION; PERINEURAL at 11:42

## 2021-12-08 RX ADMIN — PHENYLEPHRINE HYDROCHLORIDE 50 MCG: 10 INJECTION INTRAVENOUS at 11:54

## 2021-12-08 RX ADMIN — PROPOFOL 50 MG: 10 INJECTION, EMULSION INTRAVENOUS at 11:42

## 2021-12-08 RX ADMIN — SODIUM CHLORIDE, POTASSIUM CHLORIDE, SODIUM LACTATE AND CALCIUM CHLORIDE: 600; 310; 30; 20 INJECTION, SOLUTION INTRAVENOUS at 10:46

## 2021-12-08 RX ADMIN — PHENYLEPHRINE HYDROCHLORIDE 100 MCG: 10 INJECTION INTRAVENOUS at 11:49

## 2021-12-08 RX ADMIN — PROPOFOL 155 MCG/KG/MIN: 10 INJECTION, EMULSION INTRAVENOUS at 11:44

## 2021-12-08 NOTE — OP NOTE
confirmed by clear visualization of the ileocecal valve,  the appendiceal orifice, and transduction of manual pressure in the  right lower quadrant to the cecum. The Suprep bowel prep was excellent. All colonic mucosa was clearly visible. The cecum, ascending colon, and  hepatic flexure were normal.  Transverse colon and splenic flexure were  also normal.  The descending colon and sigmoid were a bit redundant, but  otherwise normal.  Upper, middle, and lower portions of the rectum were  normal.  On retroflexion, there were small grade 1 internal hemorrhoids. The colon was decompressed by suction as the scope was removed. No  biopsies were required. The patient tolerated the procedure well and  was transferred to PACU in stable condition. SPECIMENS:  None. DRAINS:  None. COMPLICATIONS:  None. DISPOSITION:  To PACU awake, alert, and stable. Following recovery, we  will discharge the patient home with gradual advancement of diet and  activity as tolerated with instructions for a healthy balanced  high-fiber, low-fat diet with fiber supplementation, increased water  intake and physical activity, decreased calories and sugar, etc.  Given  the patient's prior history of colon polyps 15 to 20 years ago,  recommend next screening colonoscopy in five years, age 78. Followup  will be with me in one to two weeks to review endoscopy.         Mike Garland    D: 12/08/2021 12:09:55       T: 12/08/2021 12:13:50     [email or web address]  Job#: [id number]     Doc#: [id number]    CC:  Laurie Valdez

## 2021-12-08 NOTE — BRIEF OP NOTE
Brief Postoperative Note      Patient: Fredderick Gosselin  YOB: 1950  MRN: [id number]    Date of Procedure: 12/8/2021    Pre-Op Diagnosis:      1. History colon polyps      2. History hemorrhoidal bleeding     3. BMI 24    Post-Op Diagnosis:      1. Normal colon     2. Grade I internal hemorrhoids       Operation:     1. Colonoscopy anus to cecum    Surgeon(s):  Laura Cates MD    Assistant:  * No surgical staff found *    Anesthesia: Monitor Anesthesia Care    Estimated Blood Loss (mL):  None    Complications: None    Specimens:  None    Findings:  As above.     Dictated # [id number]    Electronically signed by Laura Cates MD on 12/8/2021 at 12:04 PM

## 2021-12-08 NOTE — PROGRESS NOTES
Patient verbalizes readiness for discharge at this time. Discharge Criteria    Inpatients must meet Criteria 1 through 7. All other patients are either YES or N/A. If a NO is chosen then Anesthesia or Surgeon must be notified. 1.  Minimum 30 minutes after last dose of sedative medication, minimum 120 minutes after last dose of reversal agent. Yes      2. Systolic BP stable within 20 mmHg for 30 minutes & systolic BP between 90 & 962 or within 10 mmHg of baseline. Yes      3. Pulse between 60 and 100 or within 10 bpm of baseline. Yes      4. Spontaneous respiratory rate >/= 10 per minute. Yes      5. SaO2 >/= 95 or  >/= baseline. Yes      6. Able to cough and swallow or return to baseline function. Yes      7. Alert and oriented or return to baseline mental status. Yes      8. Demonstrates controlled, coordinated movements, ambulates with steady gait, or return to baseline activity function. Yes      9. Minimal or no pain or nausea, or at a level tolerable and acceptable to patient. Yes      10. Takes and retains oral fluids as allowed. Yes      11. Procedural / perioperative site stable. Minimal or no bleeding. Yes          12. If GI endoscopy procedure, minimal or no abdominal distention or passing flatus. Yes      13. Written discharge instructions and emergency telephone number provided. Yes      14. Accompanied by a responsible adult.     Yes

## 2021-12-08 NOTE — ANESTHESIA PRE PROCEDURE
Department of Anesthesiology  Preprocedure Note       Name:  Agnes Perrin   Age:  73 y.o.  :  1950                                          MRN:  [id number]         Date:  2021      Surgeon: Antonietta Rosado):  Parish Alonso MD    Procedure: Procedure(s):  COLORECTAL CANCER SCREENING, NOT HIGH RISK    Medications prior to admission:   Prior to Admission medications    Medication Sig Start Date End Date Taking?  Authorizing Provider   potassium chloride (KLOR-CON M) 20 MEQ extended release tablet Take 1 tablet by mouth daily 21  Yes Grady Alaniz MD   meloxicam (MOBIC) 7.5 MG tablet TAKE 1 TABLET BY MOUTH DAILY AS NEEDED FOR PAIN ((START AFTER PREDNISONE TAPER IS COMPLETED)) 10/20/21  Yes Hannah Naidu APRN - CNP   levothyroxine (SYNTHROID) 75 MCG tablet TAKE 1 TABLET BY MOUTH EVERY DAY 21  Yes Grady Alaniz MD   atorvastatin (LIPITOR) 20 MG tablet TAKE 1 TABLET BY MOUTH EVERY DAY 21  Yes Grady Alaniz MD   pregabalin (LYRICA) 100 MG capsule  21  Yes Historical Provider, MD   zonisamide (ZONEGRAN) 25 MG capsule Take 25 mg by mouth daily   Yes Historical Provider, MD   magnesium oxide (MAG-OX) 400 MG tablet Take 400 mg by mouth daily   Yes Historical Provider, MD   Alpha-Lipoic Acid 600 MG CAPS Take by mouth 2 times daily   Yes Historical Provider, MD   Biotin 2500 MCG CAPS Take by mouth 2 times daily   Yes Historical Provider, MD   loratadine (CLARITIN) 10 MG capsule Take 10 mg by mouth daily   Yes Historical Provider, MD   b complex vitamins capsule Take 1 capsule by mouth daily   Yes Historical Provider, MD   Cholecalciferol (VITAMIN D-3 PO) Take by mouth daily Only during the winter   Yes Historical Provider, MD   Multiple Vitamins-Minerals (MULTIVITAMIN & MINERAL PO) Take by mouth   Yes Historical Provider, MD   triamterene-hydroCHLOROthiazide (DYAZIDE) 37.5-25 MG per capsule Take 1 capsule by mouth every morning / pt requests 30 day & [facility] 10/8/21   Alysia SEVERO Alegria CNP       Current medications:    Current Facility-Administered Medications   Medication Dose Route Frequency Provider Last Rate Last Admin    lactated ringers infusion   IntraVENous Continuous Paul Solitario MD        sodium chloride flush 0.9 % injection 5-40 mL  5-40 mL IntraVENous 2 times per day Paul Solitario MD        sodium chloride flush 0.9 % injection 5-40 mL  5-40 mL IntraVENous PRN Paul Solitario MD        0.9 % sodium chloride infusion  25 mL IntraVENous PRN Paul Solitario MD        acetaminophen (TYLENOL) tablet 650 mg  650 mg Oral Once SEVERO Dias CRNA        dimenhyDRINATE (DRAMAMINE) tablet 50 mg  50 mg Oral Once SEVERO Dias CRNA           Allergies:  No Known Allergies    Problem List:    Patient Active Problem List   Diagnosis Code    Hypothyroidism E03.9    Hyperlipidemia E78.5    Hypertension I10    Asymptomatic varicose veins I83.90    Acute appendicitis K35.80    Hypokalemia E87.6    Gallbladder polyp K82.4    Cholelithiases K80.20    History of colon polyps Z86.010       Past Medical History:        Diagnosis Date    Asymptomatic varicose veins     Hyperlipidemia     Hypertension     Hypothyroidism     Neuropathy     Tremor        Past Surgical History:        Procedure Laterality Date    ACHILLES TENDON SURGERY  2005    rupture repair    CHOLECYSTECTOMY, LAPAROSCOPIC N/A 8/11/2021    CHOLECYSTECTOMY LAPAROSCOPIC ROBOTIC performed by Paul Solitario MD at [facility]  2006    2 benign polyps    COLONOSCOPY  2009    no polyps this time    COLONOSCOPY  09/08/2015    Dr. Juliet Eastman - hemorrhoids, no polyps    LAPAROSCOPIC APPENDECTOMY N/A 06/22/2021    Dr Pierce Rodriguez    nodule removed        Social History:    Social History     Tobacco Use    Smoking status: Never Smoker    Smokeless tobacco: Never Used   Substance Use Topics    Alcohol use: No     Alcohol/week: 0.0 standard drinks Counseling given: Not Answered      Vital Signs (Current):   Vitals:    12/08/21 1030   BP: 129/65   Pulse: 80   Resp: 18   Temp: 36.6 °C (97.8 °F)   TempSrc: Temporal   SpO2: 96%   Weight: 162 lb (73.5 kg)   Height: 5' 9\" (1.753 m)                                              BP Readings from Last 3 Encounters:   12/08/21 129/65   11/09/21 118/78   10/19/21 116/73       NPO Status: Time of last liquid consumption: 0430                        Time of last solid consumption: 2200                        Date of last liquid consumption: 12/08/21                        Date of last solid food consumption: 12/06/21    BMI:   Wt Readings from Last 3 Encounters:   12/08/21 162 lb (73.5 kg)   11/09/21 169 lb (76.7 kg)   10/19/21 167 lb 6.4 oz (75.9 kg)     Body mass index is 23.92 kg/m². CBC:   Lab Results   Component Value Date    WBC 4.7 09/29/2021    RBC 4.45 09/29/2021    HGB 13.2 09/29/2021    HCT 41.2 09/29/2021    MCV 92.6 09/29/2021    RDW 13.1 09/29/2021     09/29/2021       CMP:   Lab Results   Component Value Date     11/11/2021    K 4.1 11/11/2021     11/11/2021    CO2 29 11/11/2021    BUN 25 11/11/2021    CREATININE 1.09 11/11/2021    GFRAA >60 11/11/2021    LABGLOM 50 11/11/2021    GLUCOSE 77 11/11/2021    GLUCOSE 84 12/02/2011    PROT 6.7 06/21/2021    CALCIUM 9.4 11/11/2021    BILITOT 1.22 06/21/2021    ALKPHOS 75 06/21/2021    AST 24 09/29/2021    ALT 19 09/29/2021       POC Tests: No results for input(s): POCGLU, POCNA, POCK, POCCL, POCBUN, POCHEMO, POCHCT in the last 72 hours.     Coags: No results found for: PROTIME, INR, APTT    HCG (If Applicable): No results found for: PREGTESTUR, PREGSERUM, HCG, HCGQUANT     ABGs: No results found for: PHART, PO2ART, KED1RTW, WTQ8IMJ, BEART, H3EOYNDV     Type & Screen (If Applicable):  No results found for: LABABO, LABRH    Drug/Infectious Status (If Applicable):  No results found for: HIV, HEPCAB    COVID-19 Screening (If Applicable):   Lab Results   Component Value Date    COVID19 Not Detected 06/23/2020           Anesthesia Evaluation  Patient summary reviewed and Nursing notes reviewed  Airway: Mallampati: II  TM distance: >3 FB   Neck ROM: full  Mouth opening: > = 3 FB Dental: normal exam         Pulmonary:Negative Pulmonary ROS and normal exam                               Cardiovascular:    (+) hypertension: no interval change,                   Neuro/Psych:   Negative Neuro/Psych ROS              GI/Hepatic/Renal: Neg GI/Hepatic/Renal ROS            Endo/Other:    (+) hypothyroidism::., .                 Abdominal:             Vascular: negative vascular ROS. Other Findings:             Anesthesia Plan      MAC     ASA 2       Induction: intravenous. Anesthetic plan and risks discussed with patient.                       SEVERO Lane - CRNA   12/8/2021

## 2021-12-08 NOTE — ANESTHESIA POSTPROCEDURE EVALUATION
Department of Anesthesiology  Postprocedure Note    Patient: Ron Delcid  MRN: [id number]  YOB: 1950  Date of evaluation: 12/8/2021  Time:  6:56 PM     Procedure Summary     Date: 12/08/21 Room / Location: [address] [place] [address]    Anesthesia Start: 1140 Anesthesia Stop: 8479    Procedure: COLORECTAL CANCER SCREENING, NOT HIGH RISK (N/A ) Diagnosis: (HX COLON POLYPS, RECTAL BLEEDING)    Surgeons: Edda Arita MD Responsible Provider: SEVERO Shepard CRNA    Anesthesia Type: MAC ASA Status: 2          Anesthesia Type: MAC    Ana Phase I:      Ana Phase II: Ana Score: 10    Last vitals: Reviewed and per EMR flowsheets.        Anesthesia Post Evaluation    Patient location during evaluation: PACU  Patient participation: complete - patient participated  Level of consciousness: awake and alert  Pain score: 0  Airway patency: patent  Nausea & Vomiting: no nausea and no vomiting  Complications: no  Cardiovascular status: hemodynamically stable  Respiratory status: acceptable  Hydration status: euvolemic

## 2021-12-28 ENCOUNTER — VIRTUAL VISIT (OUTPATIENT)
Dept: SURGERY | Age: 71
End: 2021-12-28

## 2021-12-28 DIAGNOSIS — Z86.010 HISTORY OF COLON POLYPS: ICD-10-CM

## 2021-12-28 DIAGNOSIS — K64.0 GRADE I INTERNAL HEMORRHOIDS: ICD-10-CM

## 2021-12-28 DIAGNOSIS — Z98.890 S/P COLONOSCOPY: Primary | ICD-10-CM

## 2021-12-28 PROCEDURE — 99024 POSTOP FOLLOW-UP VISIT: CPT | Performed by: SURGERY

## 2021-12-28 NOTE — LETTER
[facility] [place] of [place] [address]  Phone: [phone]  Fax: [phone]    Gunnar Rowland MD    January 30, 2022     T[id number]    Patient: Galileo Sierra   MR Number: [id number]   YOB: 1950   Date of Visit: 12/28/2021       Dear Mateo Lopez: Thank you for referring Masood Elizabeth to me for evaluation/treatment. Below are the relevant portions of my assessment and plan of care. ASSESSMENT     Diagnosis Orders   1. S/P colonoscopy     2. History of colon polyps     3. Grade I internal hemorrhoids     4. BMI 23.0-23.9, adult         PLAN    Colonoscopy findings of normal colon and grade 1 internal hemorrhoids reviewed with Eli Calhoun. Given her prior history of colon polyps, recommend next screening colonoscopy in 5 years, age 78. Discussed importance of a healthy, balanced high-fiber low-fat diet with fiber supplementation, increased water intake and physical activity, decreased calories and sugar, etc.  She conveys clear understanding and is in agreement. If you have questions, please do not hesitate to call me. I look forward to following Yesika Masters along with you.     Sincerely,      Gunnar Rowland MD

## 2022-01-31 NOTE — PROGRESS NOTES
Iris Schultz MD  General Surgery, Endoscopy  Chief Medical Officer    [facility] [place]  [address] [address]  Dept: [phone]  Fax: [phone]    CHIEF COMPLAINT  No chief complaint on file. HPI    Sister Rosa Fong returns for follow-up by telephone after endoscopy. DATE OF PROCEDURE:  12/08/2021     ENDOSCOPY REPORT     ATTENDING SURGEON:  Dr. Cassandra Patino.     PRIMARY CARE PROVIDER:  Issa Ralph M.D.     PREOPERATIVE DIAGNOSES:  1. History of colon polyps. 2.  History of hemorrhoidal bleeding. 3.  BMI of 24.     POSTOPERATIVE DIAGNOSES:  1. Normal colon. 2.  Grade 1 internal hemorrhoids.     PROCEDURE PERFORMED:  Colonoscopy, anus to cecum.     INDICATIONS:  The patient is a pleasant 59-year-old white female  presenting for screening colonoscopy. She has a history of colon polyps  15 to 20 years ago. Most recent colonoscopy in 2015 without polyps. No  GI complaints. Occasional rectal bleeding, which she attributes to  hemorrhoids. BMI of 25. No family history of colon cancer to her  knowledge. The patient has never smoked. At this time, screening  colonoscopy is indicated.       She is doing well. No complaints.     Review of Systems    Past Medical History:   Diagnosis Date    Asymptomatic varicose veins     Hyperlipidemia     Hypertension     Hypothyroidism     Neuropathy     Tremor        Past Surgical History:   Procedure Laterality Date    ACHILLES TENDON SURGERY  2005    rupture repair    CHOLECYSTECTOMY, LAPAROSCOPIC N/A 8/11/2021    CHOLECYSTECTOMY LAPAROSCOPIC ROBOTIC performed by Iris Schultz MD at [facility]  2006    2 benign polyps    COLONOSCOPY  2009    no polyps this time    COLONOSCOPY  09/08/2015    Dr. Patria Naqvi - hemorrhoids, no polyps    COLONOSCOPY  12/08/2021    no polyps    COLONOSCOPY N/A 12/8/2021    COLORECTAL CANCER SCREENING, NOT HIGH RISK performed by Iris Schultz MD at [address] [place] [address]  LAPAROSCOPIC APPENDECTOMY N/A 2021    Dr Harinder Eisenberg    nodule removed        Family History   Problem Relation Age of Onset    High Cholesterol Mother     High Blood Pressure Mother     Alzheimer's Disease Mother     High Cholesterol Brother     Diabetes Father 76    COPD Father          at 80 from COPD complications    Alzheimer's Disease Maternal Grandfather        Allergies:  See list    Current Outpatient Medications   Medication Sig Dispense Refill    potassium chloride (KLOR-CON M) 20 MEQ extended release tablet Take 1 tablet by mouth daily 30 tablet 5    meloxicam (MOBIC) 7.5 MG tablet TAKE 1 TABLET BY MOUTH DAILY AS NEEDED FOR PAIN ((START AFTER PREDNISONE TAPER IS COMPLETED)) 30 tablet 5    triamterene-hydroCHLOROthiazide (DYAZIDE) 37.5-25 MG per capsule Take 1 capsule by mouth every morning / pt requests 30 day & [facility] 30 capsule 11    levothyroxine (SYNTHROID) 75 MCG tablet TAKE 1 TABLET BY MOUTH EVERY DAY 90 tablet 3    atorvastatin (LIPITOR) 20 MG tablet TAKE 1 TABLET BY MOUTH EVERY DAY 90 tablet 3    pregabalin (LYRICA) 100 MG capsule       zonisamide (ZONEGRAN) 25 MG capsule Take 25 mg by mouth daily      magnesium oxide (MAG-OX) 400 MG tablet Take 400 mg by mouth daily      Alpha-Lipoic Acid 600 MG CAPS Take by mouth 2 times daily      Biotin 2500 MCG CAPS Take by mouth 2 times daily      loratadine (CLARITIN) 10 MG capsule Take 10 mg by mouth daily      b complex vitamins capsule Take 1 capsule by mouth daily      Cholecalciferol (VITAMIN D-3 PO) Take by mouth daily Only during the winter      Multiple Vitamins-Minerals (MULTIVITAMIN & MINERAL PO) Take by mouth       No current facility-administered medications for this visit.        Social History     Socioeconomic History    Marital status: Single     Spouse name: Not on file    Number of children: Not on file    Years of education: Not on file    Highest education level: Not on file Occupational History    Not on file   Tobacco Use    Smoking status: Never Smoker    Smokeless tobacco: Never Used   Substance and Sexual Activity    Alcohol use: No     Alcohol/week: 0.0 standard drinks    Drug use: Never    Sexual activity: Not on file   Other Topics Concern    Not on file   Social History Narrative    Not on file     Social Determinants of Health     Financial Resource Strain: Low Risk     Difficulty of Paying Living Expenses: Not hard at all   Food Insecurity: No Food Insecurity    Worried About [address] in the Last Year: Never true    [facility] in the Last Year: Never true   Transportation Needs:     Lack of Transportation (Medical): Not on file    Lack of Transportation (Non-Medical): Not on file   Physical Activity:     Days of Exercise per Week: Not on file    Minutes of Exercise per Session: Not on file   Stress:     Feeling of Stress : Not on file   Social Connections:     Frequency of Communication with Friends and Family: Not on file    Frequency of Social Gatherings with Friends and Family: Not on file    Attends Qu[employer] Services: Not on file    Active Member of [address] [place] [address] or Organizations: Not on file    Attends Club or Organization Meetings: Not on file    Marital Status: Not on file   Intimate Partner Violence:     Fear of Current or Ex-Partner: Not on file    Emotionally Abused: Not on file    Physically Abused: Not on file    Sexually Abused: Not on file   Housing Stability:     Unable to Pay for Housing in the Last Year: Not on file    Number of [place] in the Last Year: Not on file    Unstable Housing in the Last Year: Not on file       There were no vitals taken for this visit. Physical Exam      ASSESSMENT     Diagnosis Orders   1. S/P colonoscopy     2. History of colon polyps     3. Grade I internal hemorrhoids     4.  BMI 23.0-23.9, adult         PLAN    Colonoscopy findings of normal colon and grade 1 internal hemorrhoids reviewed with Mirian Queen. Given her prior history of colon polyps, recommend next screening colonoscopy in 5 years, age 78. Discussed importance of a healthy, balanced high-fiber low-fat diet with fiber supplementation, increased water intake and physical activity, decreased calories and sugar, etc.  She conveys clear understanding and is in agreement.      Viki Aase, MD

## 2022-03-08 ENCOUNTER — HOSPITAL ENCOUNTER (OUTPATIENT)
Age: 72
Discharge: HOME | End: 2022-03-10
Payer: MEDICARE

## 2022-03-08 ENCOUNTER — HOSPITAL ENCOUNTER (OUTPATIENT)
Dept: GENERAL RADIOLOGY | Age: 72
Discharge: HOME | End: 2022-03-10
Payer: MEDICARE

## 2022-03-08 DIAGNOSIS — R19.7 DIARRHEA, UNSPECIFIED TYPE: ICD-10-CM

## 2022-03-08 DIAGNOSIS — R11.0 NAUSEA: ICD-10-CM

## 2022-03-08 PROCEDURE — 74019 RADEX ABDOMEN 2 VIEWS: CPT

## 2022-04-01 ENCOUNTER — HOSPITAL ENCOUNTER (OUTPATIENT)
Age: 72
Discharge: HOME | End: 2022-04-01
Payer: MEDICARE

## 2022-04-01 DIAGNOSIS — I10 HYPERTENSION, UNSPECIFIED TYPE: ICD-10-CM

## 2022-04-01 DIAGNOSIS — E78.2 MIXED HYPERLIPIDEMIA: ICD-10-CM

## 2022-04-01 DIAGNOSIS — E03.9 HYPOTHYROIDISM, UNSPECIFIED TYPE: ICD-10-CM

## 2022-04-01 LAB
ALT SERPL-CCNC: 25 U/L (ref 5–33)
ANION GAP SERPL CALCULATED.3IONS-SCNC: 9 MMOL/L (ref 9–17)
AST SERPL-CCNC: 30 U/L
BUN BLDV-MCNC: 22 MG/DL (ref 8–23)
BUN/CREAT BLD: 24 (ref 9–20)
CALCIUM SERPL-MCNC: 9.4 MG/DL (ref 8.6–10.4)
CHLORIDE BLD-SCNC: 102 MMOL/L (ref 98–107)
CHOLESTEROL/HDL RATIO: 2.9
CHOLESTEROL: 160 MG/DL
CO2: 28 MMOL/L (ref 20–31)
CREAT SERPL-MCNC: 0.93 MG/DL (ref 0.5–0.9)
GFR AFRICAN AMERICAN: >60 ML/MIN
GFR NON-AFRICAN AMERICAN: 60 ML/MIN
GFR SERPL CREATININE-BSD FRML MDRD: ABNORMAL ML/MIN/{1.73_M2}
GFR SERPL CREATININE-BSD FRML MDRD: ABNORMAL ML/MIN/{1.73_M2}
GLUCOSE BLD-MCNC: 90 MG/DL (ref 70–99)
HCT VFR BLD CALC: 41.4 % (ref 36.3–47.1)
HDLC SERPL-MCNC: 55 MG/DL
HEMOGLOBIN: 13.1 G/DL (ref 11.9–15.1)
LDL CHOLESTEROL: 84 MG/DL (ref 0–130)
MCH RBC QN AUTO: 28.5 PG (ref 25.2–33.5)
MCHC RBC AUTO-ENTMCNC: 31.6 G/DL (ref 28.4–34.8)
MCV RBC AUTO: 90.2 FL (ref 82.6–102.9)
NRBC AUTOMATED: 0 PER 100 WBC
PDW BLD-RTO: 12 % (ref 11.8–14.4)
PLATELET # BLD: 271 K/UL (ref 138–453)
PMV BLD AUTO: 10.5 FL (ref 8.1–13.5)
POTASSIUM SERPL-SCNC: 3.7 MMOL/L (ref 3.7–5.3)
RBC # BLD: 4.59 M/UL (ref 3.95–5.11)
SODIUM BLD-SCNC: 139 MMOL/L (ref 135–144)
T3 FREE: 2.93 PG/ML (ref 2.02–4.43)
THYROXINE, FREE: 1.5 NG/DL (ref 0.93–1.7)
TRIGL SERPL-MCNC: 105 MG/DL
TSH SERPL DL<=0.05 MIU/L-ACNC: 1.82 UIU/ML (ref 0.3–5)
WBC # BLD: 6.9 K/UL (ref 3.5–11.3)

## 2022-04-01 PROCEDURE — 84460 ALANINE AMINO (ALT) (SGPT): CPT

## 2022-04-01 PROCEDURE — 36415 COLL VENOUS BLD VENIPUNCTURE: CPT

## 2022-04-01 PROCEDURE — 84439 ASSAY OF FREE THYROXINE: CPT

## 2022-04-01 PROCEDURE — 84481 FREE ASSAY (FT-3): CPT

## 2022-04-01 PROCEDURE — 80048 BASIC METABOLIC PNL TOTAL CA: CPT

## 2022-04-01 PROCEDURE — 80061 LIPID PANEL: CPT

## 2022-04-01 PROCEDURE — 84443 ASSAY THYROID STIM HORMONE: CPT

## 2022-04-01 PROCEDURE — 84450 TRANSFERASE (AST) (SGOT): CPT

## 2022-04-01 PROCEDURE — 85027 COMPLETE CBC AUTOMATED: CPT

## 2022-09-30 ENCOUNTER — HOSPITAL ENCOUNTER (OUTPATIENT)
Age: 72
Discharge: HOME | End: 2022-09-30
Payer: MEDICARE

## 2022-09-30 DIAGNOSIS — E78.2 MIXED HYPERLIPIDEMIA: ICD-10-CM

## 2022-09-30 DIAGNOSIS — I10 HYPERTENSION, UNSPECIFIED TYPE: ICD-10-CM

## 2022-09-30 DIAGNOSIS — E03.9 HYPOTHYROIDISM, UNSPECIFIED TYPE: ICD-10-CM

## 2022-09-30 LAB
ALT SERPL-CCNC: 21 U/L (ref 5–33)
ANION GAP SERPL CALCULATED.3IONS-SCNC: 11 MMOL/L (ref 9–17)
AST SERPL-CCNC: 28 U/L
BUN BLDV-MCNC: 23 MG/DL (ref 8–23)
BUN/CREAT BLD: 27 (ref 9–20)
CALCIUM SERPL-MCNC: 9.4 MG/DL (ref 8.6–10.4)
CHLORIDE BLD-SCNC: 104 MMOL/L (ref 98–107)
CHOLESTEROL/HDL RATIO: 3.2
CHOLESTEROL: 165 MG/DL
CO2: 28 MMOL/L (ref 20–31)
CREAT SERPL-MCNC: 0.85 MG/DL (ref 0.5–0.9)
GFR AFRICAN AMERICAN: >60 ML/MIN
GFR NON-AFRICAN AMERICAN: >60 ML/MIN
GFR SERPL CREATININE-BSD FRML MDRD: ABNORMAL ML/MIN/{1.73_M2}
GFR SERPL CREATININE-BSD FRML MDRD: ABNORMAL ML/MIN/{1.73_M2}
GLUCOSE BLD-MCNC: 89 MG/DL (ref 70–99)
HCT VFR BLD CALC: 43 % (ref 36.3–47.1)
HDLC SERPL-MCNC: 51 MG/DL
HEMOGLOBIN: 13.9 G/DL (ref 11.9–15.1)
LDL CHOLESTEROL: 86 MG/DL (ref 0–130)
MCH RBC QN AUTO: 29.4 PG (ref 25.2–33.5)
MCHC RBC AUTO-ENTMCNC: 32.3 G/DL (ref 28.4–34.8)
MCV RBC AUTO: 90.9 FL (ref 82.6–102.9)
NRBC AUTOMATED: 0 PER 100 WBC
PDW BLD-RTO: 12.6 % (ref 11.8–14.4)
PLATELET # BLD: 206 K/UL (ref 138–453)
PMV BLD AUTO: 11.5 FL (ref 8.1–13.5)
POTASSIUM SERPL-SCNC: 4.3 MMOL/L (ref 3.7–5.3)
RBC # BLD: 4.73 M/UL (ref 3.95–5.11)
SODIUM BLD-SCNC: 143 MMOL/L (ref 135–144)
T3 FREE: 2.79 PG/ML (ref 2.02–4.43)
THYROXINE, FREE: 1.37 NG/DL (ref 0.93–1.7)
TRIGL SERPL-MCNC: 138 MG/DL
TSH SERPL DL<=0.05 MIU/L-ACNC: 2.39 UIU/ML (ref 0.3–5)
WBC # BLD: 5.3 K/UL (ref 3.5–11.3)

## 2022-09-30 PROCEDURE — 84481 FREE ASSAY (FT-3): CPT

## 2022-09-30 PROCEDURE — 36415 COLL VENOUS BLD VENIPUNCTURE: CPT

## 2022-09-30 PROCEDURE — 80061 LIPID PANEL: CPT

## 2022-09-30 PROCEDURE — 84450 TRANSFERASE (AST) (SGOT): CPT

## 2022-09-30 PROCEDURE — 80048 BASIC METABOLIC PNL TOTAL CA: CPT

## 2022-09-30 PROCEDURE — 84460 ALANINE AMINO (ALT) (SGPT): CPT

## 2022-09-30 PROCEDURE — 84443 ASSAY THYROID STIM HORMONE: CPT

## 2022-09-30 PROCEDURE — 85027 COMPLETE CBC AUTOMATED: CPT

## 2022-09-30 PROCEDURE — 84439 ASSAY OF FREE THYROXINE: CPT

## 2023-02-16 ENCOUNTER — HOSPITAL ENCOUNTER (OUTPATIENT)
Dept: WOMENS IMAGING | Age: 73
Discharge: HOME | End: 2023-02-18
Payer: MEDICARE

## 2023-02-16 DIAGNOSIS — Z12.31 ENCOUNTER FOR SCREENING MAMMOGRAM FOR MALIGNANT NEOPLASM OF BREAST: ICD-10-CM

## 2023-02-16 PROCEDURE — 77067 SCR MAMMO BI INCL CAD: CPT

## 2023-04-03 ENCOUNTER — HOSPITAL ENCOUNTER (OUTPATIENT)
Age: 73
Discharge: HOME | End: 2023-04-03
Payer: MEDICARE

## 2023-04-03 DIAGNOSIS — I10 HYPERTENSION, UNSPECIFIED TYPE: ICD-10-CM

## 2023-04-03 DIAGNOSIS — E78.2 MIXED HYPERLIPIDEMIA: ICD-10-CM

## 2023-04-03 DIAGNOSIS — E03.9 HYPOTHYROIDISM, UNSPECIFIED TYPE: ICD-10-CM

## 2023-04-03 LAB
ALT SERPL-CCNC: 22 U/L (ref 5–33)
ANION GAP SERPL CALCULATED.3IONS-SCNC: 10 MMOL/L (ref 9–17)
AST SERPL-CCNC: 29 U/L
BUN SERPL-MCNC: 21 MG/DL (ref 8–23)
BUN/CREAT BLD: 24 (ref 9–20)
CALCIUM SERPL-MCNC: 9.6 MG/DL (ref 8.6–10.4)
CHLORIDE SERPL-SCNC: 106 MMOL/L (ref 98–107)
CHOLEST SERPL-MCNC: 171 MG/DL
CHOLESTEROL/HDL RATIO: 3.1
CO2 SERPL-SCNC: 30 MMOL/L (ref 20–31)
CREAT SERPL-MCNC: 0.89 MG/DL (ref 0.5–0.9)
GFR SERPL CREATININE-BSD FRML MDRD: >60 ML/MIN/1.73M2
GLUCOSE SERPL-MCNC: 86 MG/DL (ref 70–99)
HCT VFR BLD AUTO: 43.8 % (ref 36.3–47.1)
HDLC SERPL-MCNC: 56 MG/DL
HGB BLD-MCNC: 14.2 G/DL (ref 11.9–15.1)
LDLC SERPL CALC-MCNC: 93 MG/DL (ref 0–130)
MCH RBC QN AUTO: 28.9 PG (ref 25.2–33.5)
MCHC RBC AUTO-ENTMCNC: 32.4 G/DL (ref 28.4–34.8)
MCV RBC AUTO: 89.2 FL (ref 82.6–102.9)
NRBC AUTOMATED: 0 PER 100 WBC
PDW BLD-RTO: 13 % (ref 11.8–14.4)
PLATELET # BLD AUTO: 238 K/UL (ref 138–453)
PMV BLD AUTO: 11.2 FL (ref 8.1–13.5)
POTASSIUM SERPL-SCNC: 4.1 MMOL/L (ref 3.7–5.3)
RBC # BLD: 4.91 M/UL (ref 3.95–5.11)
SODIUM SERPL-SCNC: 146 MMOL/L (ref 135–144)
T3FREE SERPL-MCNC: 2.55 PG/ML (ref 2.02–4.43)
T4 FREE SERPL-MCNC: 1.48 NG/DL (ref 0.93–1.7)
TRIGL SERPL-MCNC: 109 MG/DL
TSH SERPL-ACNC: 1.96 UIU/ML (ref 0.3–5)
WBC # BLD AUTO: 4.9 K/UL (ref 3.5–11.3)

## 2023-04-03 PROCEDURE — 84443 ASSAY THYROID STIM HORMONE: CPT

## 2023-04-03 PROCEDURE — 84460 ALANINE AMINO (ALT) (SGPT): CPT

## 2023-04-03 PROCEDURE — 36415 COLL VENOUS BLD VENIPUNCTURE: CPT

## 2023-04-03 PROCEDURE — 84439 ASSAY OF FREE THYROXINE: CPT

## 2023-04-03 PROCEDURE — 80048 BASIC METABOLIC PNL TOTAL CA: CPT

## 2023-04-03 PROCEDURE — 84481 FREE ASSAY (FT-3): CPT

## 2023-04-03 PROCEDURE — 84450 TRANSFERASE (AST) (SGOT): CPT

## 2023-04-03 PROCEDURE — 80061 LIPID PANEL: CPT

## 2023-04-03 PROCEDURE — 85027 COMPLETE CBC AUTOMATED: CPT

## 2023-04-30 ENCOUNTER — HOSPITAL ENCOUNTER (OUTPATIENT)
Age: 73
Setting detail: OBSERVATION
Discharge: HOME | End: 2023-05-01
Attending: STUDENT IN AN ORGANIZED HEALTH CARE EDUCATION/TRAINING PROGRAM | Admitting: INTERNAL MEDICINE
Payer: MEDICARE

## 2023-04-30 ENCOUNTER — APPOINTMENT (OUTPATIENT)
Dept: GENERAL RADIOLOGY | Age: 73
End: 2023-04-30
Payer: MEDICARE

## 2023-04-30 DIAGNOSIS — R07.9 CHEST PAIN, UNSPECIFIED TYPE: Primary | ICD-10-CM

## 2023-04-30 LAB
ABSOLUTE EOS #: 0.15 K/UL (ref 0–0.44)
ABSOLUTE IMMATURE GRANULOCYTE: <0.03 K/UL (ref 0–0.3)
ABSOLUTE LYMPH #: 1.75 K/UL (ref 1.1–3.7)
ABSOLUTE MONO #: 0.75 K/UL (ref 0.1–1.2)
ANION GAP SERPL CALCULATED.3IONS-SCNC: 11 MMOL/L (ref 9–17)
BASOPHILS # BLD: 1 % (ref 0–2)
BASOPHILS ABSOLUTE: 0.08 K/UL (ref 0–0.2)
BUN SERPL-MCNC: 22 MG/DL (ref 8–23)
BUN/CREAT BLD: 26 (ref 9–20)
CALCIUM SERPL-MCNC: 10 MG/DL (ref 8.6–10.4)
CHLORIDE SERPL-SCNC: 104 MMOL/L (ref 98–107)
CO2 SERPL-SCNC: 28 MMOL/L (ref 20–31)
CREAT SERPL-MCNC: 0.85 MG/DL (ref 0.5–0.9)
EKG ATRIAL RATE: 84 BPM
EKG P AXIS: 56 DEGREES
EKG P-R INTERVAL: 202 MS
EKG Q-T INTERVAL: 376 MS
EKG QRS DURATION: 82 MS
EKG QTC CALCULATION (BAZETT): 444 MS
EKG R AXIS: -18 DEGREES
EKG T AXIS: 37 DEGREES
EKG VENTRICULAR RATE: 84 BPM
EOSINOPHILS RELATIVE PERCENT: 2 % (ref 1–4)
GFR SERPL CREATININE-BSD FRML MDRD: >60 ML/MIN/1.73M2
GLUCOSE SERPL-MCNC: 125 MG/DL (ref 70–99)
HCT VFR BLD AUTO: 46 % (ref 36.3–47.1)
HGB BLD-MCNC: 15.2 G/DL (ref 11.9–15.1)
IMMATURE GRANULOCYTES: 0 %
LYMPHOCYTES # BLD: 21 % (ref 24–43)
MCH RBC QN AUTO: 29.1 PG (ref 25.2–33.5)
MCHC RBC AUTO-ENTMCNC: 33 G/DL (ref 28.4–34.8)
MCV RBC AUTO: 88.1 FL (ref 82.6–102.9)
MONOCYTES # BLD: 9 % (ref 3–12)
NRBC AUTOMATED: 0 PER 100 WBC
PDW BLD-RTO: 12.5 % (ref 11.8–14.4)
PLATELET # BLD AUTO: 216 K/UL (ref 138–453)
PMV BLD AUTO: 10.7 FL (ref 8.1–13.5)
POTASSIUM SERPL-SCNC: 3.6 MMOL/L (ref 3.7–5.3)
RBC # BLD: 5.22 M/UL (ref 3.95–5.11)
SEG NEUTROPHILS: 67 % (ref 36–65)
SEGMENTED NEUTROPHILS ABSOLUTE COUNT: 5.46 K/UL (ref 1.5–8.1)
SODIUM SERPL-SCNC: 143 MMOL/L (ref 135–144)
TROPONIN I SERPL DL<=0.01 NG/ML-MCNC: 11 NG/L (ref 0–14)
TROPONIN I SERPL DL<=0.01 NG/ML-MCNC: 11 NG/L (ref 0–14)
WBC # BLD AUTO: 8.2 K/UL (ref 3.5–11.3)

## 2023-04-30 PROCEDURE — 94761 N-INVAS EAR/PLS OXIMETRY MLT: CPT

## 2023-04-30 PROCEDURE — 93005 ELECTROCARDIOGRAM TRACING: CPT | Performed by: STUDENT IN AN ORGANIZED HEALTH CARE EDUCATION/TRAINING PROGRAM

## 2023-04-30 PROCEDURE — 99285 EMERGENCY DEPT VISIT HI MDM: CPT

## 2023-04-30 PROCEDURE — G0378 HOSPITAL OBSERVATION PER HR: HCPCS

## 2023-04-30 PROCEDURE — 80048 BASIC METABOLIC PNL TOTAL CA: CPT

## 2023-04-30 PROCEDURE — 36415 COLL VENOUS BLD VENIPUNCTURE: CPT

## 2023-04-30 PROCEDURE — 93010 ELECTROCARDIOGRAM REPORT: CPT | Performed by: FAMILY MEDICINE

## 2023-04-30 PROCEDURE — 6370000000 HC RX 637 (ALT 250 FOR IP): Performed by: STUDENT IN AN ORGANIZED HEALTH CARE EDUCATION/TRAINING PROGRAM

## 2023-04-30 PROCEDURE — 6370000000 HC RX 637 (ALT 250 FOR IP): Performed by: INTERNAL MEDICINE

## 2023-04-30 PROCEDURE — 71045 X-RAY EXAM CHEST 1 VIEW: CPT

## 2023-04-30 PROCEDURE — 84484 ASSAY OF TROPONIN QUANT: CPT

## 2023-04-30 PROCEDURE — 85025 COMPLETE CBC W/AUTO DIFF WBC: CPT

## 2023-04-30 PROCEDURE — 2580000003 HC RX 258: Performed by: INTERNAL MEDICINE

## 2023-04-30 RX ORDER — PREGABALIN 75 MG/1
300 CAPSULE ORAL NIGHTLY
Status: DISCONTINUED | OUTPATIENT
Start: 2023-04-30 | End: 2023-05-01 | Stop reason: HOSPADM

## 2023-04-30 RX ORDER — SODIUM CHLORIDE 9 MG/ML
INJECTION, SOLUTION INTRAVENOUS PRN
Status: DISCONTINUED | OUTPATIENT
Start: 2023-04-30 | End: 2023-05-01 | Stop reason: HOSPADM

## 2023-04-30 RX ORDER — ASPIRIN 81 MG/1
324 TABLET, CHEWABLE ORAL ONCE
Status: COMPLETED | OUTPATIENT
Start: 2023-04-30 | End: 2023-04-30

## 2023-04-30 RX ORDER — ZONISAMIDE 25 MG/1
75 CAPSULE ORAL 2 TIMES DAILY
Status: DISCONTINUED | OUTPATIENT
Start: 2023-04-30 | End: 2023-05-01 | Stop reason: HOSPADM

## 2023-04-30 RX ORDER — SODIUM CHLORIDE 0.9 % (FLUSH) 0.9 %
10 SYRINGE (ML) INJECTION PRN
Status: DISCONTINUED | OUTPATIENT
Start: 2023-04-30 | End: 2023-05-01 | Stop reason: HOSPADM

## 2023-04-30 RX ORDER — SODIUM CHLORIDE 9 MG/ML
INJECTION, SOLUTION INTRAVENOUS CONTINUOUS
Status: DISCONTINUED | OUTPATIENT
Start: 2023-04-30 | End: 2023-04-30

## 2023-04-30 RX ORDER — ATORVASTATIN CALCIUM 20 MG/1
20 TABLET, FILM COATED ORAL NIGHTLY
Status: DISCONTINUED | OUTPATIENT
Start: 2023-04-30 | End: 2023-05-01 | Stop reason: HOSPADM

## 2023-04-30 RX ORDER — LEVOTHYROXINE SODIUM 0.07 MG/1
75 TABLET ORAL DAILY
Status: DISCONTINUED | OUTPATIENT
Start: 2023-05-01 | End: 2023-05-01 | Stop reason: HOSPADM

## 2023-04-30 RX ORDER — ATORVASTATIN CALCIUM 20 MG/1
20 TABLET, FILM COATED ORAL DAILY
Status: DISCONTINUED | OUTPATIENT
Start: 2023-04-30 | End: 2023-04-30

## 2023-04-30 RX ORDER — ACETAMINOPHEN 325 MG/1
650 TABLET ORAL EVERY 6 HOURS PRN
Status: DISCONTINUED | OUTPATIENT
Start: 2023-04-30 | End: 2023-05-01 | Stop reason: HOSPADM

## 2023-04-30 RX ORDER — SODIUM CHLORIDE 0.9 % (FLUSH) 0.9 %
5-40 SYRINGE (ML) INJECTION EVERY 12 HOURS SCHEDULED
Status: DISCONTINUED | OUTPATIENT
Start: 2023-04-30 | End: 2023-05-01 | Stop reason: HOSPADM

## 2023-04-30 RX ORDER — PREGABALIN 50 MG/1
100 CAPSULE ORAL DAILY
Status: DISCONTINUED | OUTPATIENT
Start: 2023-05-01 | End: 2023-05-01 | Stop reason: HOSPADM

## 2023-04-30 RX ORDER — ONDANSETRON 2 MG/ML
4 INJECTION INTRAMUSCULAR; INTRAVENOUS EVERY 6 HOURS PRN
Status: DISCONTINUED | OUTPATIENT
Start: 2023-04-30 | End: 2023-05-01 | Stop reason: HOSPADM

## 2023-04-30 RX ORDER — POTASSIUM CHLORIDE 20 MEQ/1
20 TABLET, EXTENDED RELEASE ORAL DAILY
Status: DISCONTINUED | OUTPATIENT
Start: 2023-04-30 | End: 2023-05-01 | Stop reason: HOSPADM

## 2023-04-30 RX ORDER — SODIUM CHLORIDE 9 MG/ML
INJECTION, SOLUTION INTRAVENOUS CONTINUOUS
Status: DISCONTINUED | OUTPATIENT
Start: 2023-04-30 | End: 2023-05-01

## 2023-04-30 RX ORDER — TRIAMTERENE AND HYDROCHLOROTHIAZIDE 37.5; 25 MG/1; MG/1
1 TABLET ORAL EVERY MORNING
Status: DISCONTINUED | OUTPATIENT
Start: 2023-05-01 | End: 2023-05-01 | Stop reason: HOSPADM

## 2023-04-30 RX ORDER — ACETAMINOPHEN 650 MG/1
650 SUPPOSITORY RECTAL EVERY 6 HOURS PRN
Status: DISCONTINUED | OUTPATIENT
Start: 2023-04-30 | End: 2023-05-01 | Stop reason: HOSPADM

## 2023-04-30 RX ORDER — ONDANSETRON 4 MG/1
4 TABLET, ORALLY DISINTEGRATING ORAL EVERY 8 HOURS PRN
Status: DISCONTINUED | OUTPATIENT
Start: 2023-04-30 | End: 2023-05-01 | Stop reason: HOSPADM

## 2023-04-30 RX ORDER — NITROGLYCERIN 0.4 MG/1
0.4 TABLET SUBLINGUAL EVERY 5 MIN PRN
Status: DISCONTINUED | OUTPATIENT
Start: 2023-04-30 | End: 2023-05-01 | Stop reason: HOSPADM

## 2023-04-30 RX ORDER — ENOXAPARIN SODIUM 100 MG/ML
40 INJECTION SUBCUTANEOUS DAILY
Status: DISCONTINUED | OUTPATIENT
Start: 2023-04-30 | End: 2023-05-01 | Stop reason: HOSPADM

## 2023-04-30 RX ORDER — NITROGLYCERIN 20 MG/100ML
5-200 INJECTION INTRAVENOUS CONTINUOUS
Status: DISCONTINUED | OUTPATIENT
Start: 2023-04-30 | End: 2023-04-30

## 2023-04-30 RX ORDER — POLYETHYLENE GLYCOL 3350 17 G/17G
17 POWDER, FOR SOLUTION ORAL DAILY PRN
Status: DISCONTINUED | OUTPATIENT
Start: 2023-04-30 | End: 2023-05-01 | Stop reason: HOSPADM

## 2023-04-30 RX ADMIN — ASPIRIN 324 MG: 81 TABLET, CHEWABLE ORAL at 09:05

## 2023-04-30 RX ADMIN — SODIUM CHLORIDE: 9 INJECTION, SOLUTION INTRAVENOUS at 21:13

## 2023-04-30 RX ADMIN — NITROGLYCERIN 0.4 MG: 0.4 TABLET SUBLINGUAL at 09:05

## 2023-04-30 RX ADMIN — PREGABALIN 300 MG: 75 CAPSULE ORAL at 21:49

## 2023-04-30 RX ADMIN — ATORVASTATIN CALCIUM 20 MG: 20 TABLET, FILM COATED ORAL at 21:09

## 2023-04-30 ASSESSMENT — PAIN SCALES - GENERAL
PAINLEVEL_OUTOF10: 1
PAINLEVEL_OUTOF10: 0
PAINLEVEL_OUTOF10: 6

## 2023-04-30 ASSESSMENT — PAIN DESCRIPTION - LOCATION
LOCATION: CHEST
LOCATION: CHEST

## 2023-04-30 ASSESSMENT — PAIN DESCRIPTION - FREQUENCY: FREQUENCY: INTERMITTENT

## 2023-04-30 ASSESSMENT — PAIN - FUNCTIONAL ASSESSMENT: PAIN_FUNCTIONAL_ASSESSMENT: 0-10

## 2023-04-30 ASSESSMENT — PAIN DESCRIPTION - DESCRIPTORS: DESCRIPTORS: SORE

## 2023-04-30 ASSESSMENT — PAIN DESCRIPTION - ORIENTATION: ORIENTATION: MID

## 2023-04-30 ASSESSMENT — PAIN DESCRIPTION - PAIN TYPE: TYPE: ACUTE PAIN

## 2023-04-30 ASSESSMENT — HEART SCORE: ECG: 0

## 2023-05-01 ENCOUNTER — APPOINTMENT (OUTPATIENT)
Dept: NON INVASIVE DIAGNOSTICS | Age: 73
End: 2023-05-01
Payer: MEDICARE

## 2023-05-01 VITALS
TEMPERATURE: 97.4 F | OXYGEN SATURATION: 96 % | BODY MASS INDEX: 26.84 KG/M2 | RESPIRATION RATE: 22 BRPM | SYSTOLIC BLOOD PRESSURE: 145 MMHG | HEART RATE: 73 BPM | HEIGHT: 69 IN | WEIGHT: 181.22 LBS | DIASTOLIC BLOOD PRESSURE: 81 MMHG

## 2023-05-01 LAB
ABSOLUTE EOS #: 0.14 K/UL (ref 0–0.44)
ABSOLUTE IMMATURE GRANULOCYTE: <0.03 K/UL (ref 0–0.3)
ABSOLUTE LYMPH #: 1.59 K/UL (ref 1.1–3.7)
ABSOLUTE MONO #: 0.87 K/UL (ref 0.1–1.2)
ANION GAP SERPL CALCULATED.3IONS-SCNC: 8 MMOL/L (ref 9–17)
BASOPHILS # BLD: 1 % (ref 0–2)
BASOPHILS ABSOLUTE: 0.06 K/UL (ref 0–0.2)
BUN SERPL-MCNC: 18 MG/DL (ref 8–23)
BUN/CREAT BLD: 20 (ref 9–20)
CALCIUM SERPL-MCNC: 9.2 MG/DL (ref 8.6–10.4)
CHLORIDE SERPL-SCNC: 106 MMOL/L (ref 98–107)
CO2 SERPL-SCNC: 28 MMOL/L (ref 20–31)
CREAT SERPL-MCNC: 0.89 MG/DL (ref 0.5–0.9)
EOSINOPHILS RELATIVE PERCENT: 2 % (ref 1–4)
GFR SERPL CREATININE-BSD FRML MDRD: >60 ML/MIN/1.73M2
GLUCOSE SERPL-MCNC: 89 MG/DL (ref 70–99)
HCT VFR BLD AUTO: 42.5 % (ref 36.3–47.1)
HGB BLD-MCNC: 14.2 G/DL (ref 11.9–15.1)
IMMATURE GRANULOCYTES: 0 %
LV EF: 65 %
LVEF MODALITY: NORMAL
LYMPHOCYTES # BLD: 22 % (ref 24–43)
MCH RBC QN AUTO: 29 PG (ref 25.2–33.5)
MCHC RBC AUTO-ENTMCNC: 33.4 G/DL (ref 28.4–34.8)
MCV RBC AUTO: 86.9 FL (ref 82.6–102.9)
MONOCYTES # BLD: 12 % (ref 3–12)
NRBC AUTOMATED: 0 PER 100 WBC
PDW BLD-RTO: 12.7 % (ref 11.8–14.4)
PLATELET # BLD AUTO: 189 K/UL (ref 138–453)
PMV BLD AUTO: 11.2 FL (ref 8.1–13.5)
POTASSIUM SERPL-SCNC: 3.8 MMOL/L (ref 3.7–5.3)
RBC # BLD: 4.89 M/UL (ref 3.95–5.11)
SEG NEUTROPHILS: 63 % (ref 36–65)
SEGMENTED NEUTROPHILS ABSOLUTE COUNT: 4.69 K/UL (ref 1.5–8.1)
SODIUM SERPL-SCNC: 142 MMOL/L (ref 135–144)
WBC # BLD AUTO: 7.4 K/UL (ref 3.5–11.3)

## 2023-05-01 PROCEDURE — 93306 TTE W/DOPPLER COMPLETE: CPT

## 2023-05-01 PROCEDURE — 85025 COMPLETE CBC W/AUTO DIFF WBC: CPT

## 2023-05-01 PROCEDURE — A9500 TC99M SESTAMIBI: HCPCS | Performed by: INTERNAL MEDICINE

## 2023-05-01 PROCEDURE — G0378 HOSPITAL OBSERVATION PER HR: HCPCS

## 2023-05-01 PROCEDURE — 94761 N-INVAS EAR/PLS OXIMETRY MLT: CPT

## 2023-05-01 PROCEDURE — 6370000000 HC RX 637 (ALT 250 FOR IP): Performed by: INTERNAL MEDICINE

## 2023-05-01 PROCEDURE — 80048 BASIC METABOLIC PNL TOTAL CA: CPT

## 2023-05-01 PROCEDURE — 3430000000 HC RX DIAGNOSTIC RADIOPHARMACEUTICAL: Performed by: INTERNAL MEDICINE

## 2023-05-01 PROCEDURE — 6360000002 HC RX W HCPCS: Performed by: INTERNAL MEDICINE

## 2023-05-01 PROCEDURE — 93017 CV STRESS TEST TRACING ONLY: CPT

## 2023-05-01 PROCEDURE — 2580000003 HC RX 258: Performed by: INTERNAL MEDICINE

## 2023-05-01 PROCEDURE — 96372 THER/PROPH/DIAG INJ SC/IM: CPT

## 2023-05-01 PROCEDURE — 36415 COLL VENOUS BLD VENIPUNCTURE: CPT

## 2023-05-01 PROCEDURE — 78452 HT MUSCLE IMAGE SPECT MULT: CPT

## 2023-05-01 RX ORDER — TETRAKIS(2-METHOXYISOBUTYLISOCYANIDE)COPPER(I) TETRAFLUOROBORATE 1 MG/ML
30 INJECTION, POWDER, LYOPHILIZED, FOR SOLUTION INTRAVENOUS
Status: COMPLETED | OUTPATIENT
Start: 2023-05-01 | End: 2023-05-01

## 2023-05-01 RX ORDER — TETRAKIS(2-METHOXYISOBUTYLISOCYANIDE)COPPER(I) TETRAFLUOROBORATE 1 MG/ML
10 INJECTION, POWDER, LYOPHILIZED, FOR SOLUTION INTRAVENOUS
Status: COMPLETED | OUTPATIENT
Start: 2023-05-01 | End: 2023-05-01

## 2023-05-01 RX ADMIN — POTASSIUM CHLORIDE 20 MEQ: 1500 TABLET, EXTENDED RELEASE ORAL at 14:02

## 2023-05-01 RX ADMIN — Medication 10 MILLICURIE: at 12:22

## 2023-05-01 RX ADMIN — LEVOTHYROXINE SODIUM 75 MCG: 0.07 TABLET ORAL at 14:02

## 2023-05-01 RX ADMIN — SODIUM CHLORIDE: 9 INJECTION, SOLUTION INTRAVENOUS at 11:05

## 2023-05-01 RX ADMIN — ENOXAPARIN SODIUM 40 MG: 100 INJECTION SUBCUTANEOUS at 14:02

## 2023-05-01 RX ADMIN — PREGABALIN 100 MG: 50 CAPSULE ORAL at 14:02

## 2023-05-01 RX ADMIN — Medication 30 MILLICURIE: at 12:23

## 2023-05-01 RX ADMIN — TRIAMTERENE AND HYDROCHLOROTHIAZIDE 1 TABLET: 37.5; 25 TABLET ORAL at 14:00

## 2023-05-01 ASSESSMENT — PAIN DESCRIPTION - DESCRIPTORS: DESCRIPTORS: SORE

## 2023-05-01 ASSESSMENT — PAIN DESCRIPTION - LOCATION: LOCATION: CHEST

## 2023-05-01 ASSESSMENT — PAIN SCALES - GENERAL: PAINLEVEL_OUTOF10: 2

## 2023-05-01 ASSESSMENT — PAIN DESCRIPTION - PAIN TYPE: TYPE: ACUTE PAIN

## 2023-05-01 ASSESSMENT — PAIN DESCRIPTION - FREQUENCY: FREQUENCY: INTERMITTENT

## 2023-05-01 ASSESSMENT — PAIN DESCRIPTION - ORIENTATION: ORIENTATION: MID

## 2023-05-01 NOTE — PROGRESS NOTES
[facility], checked site and decided to start new one and pull this one. Some redness noted at site and leaking under dressing. New site R forearm, 1 attempt. Tolerated well.

## 2023-05-01 NOTE — DISCHARGE INSTR - DIET

## 2023-05-01 NOTE — H&P
negative for visual disturbance   ENT: negative for sore throat, negative nasal congestion, and negative for earache  Respiratory: negative for shortness of breath, negative for cough, and negative for wheezing  Cardiovascular: positive for chest pain, negative for palpitations, and negative for syncope  Gastrointestinal: negative for abdominal pain, negative for nausea,negative for vomiting, negative for diarrhea, negative for constipation, and negative for hematochezia or melena  Genitourinary: negative for dysuria, negative for urinary urgency, negative for urinary frequency, and negative for hematuria  Skin: negative for skin rash, and negative for skin lesions  Neurological: negative for unilateral weakness, numbness or tingling. Physical Exam:    Vitals:   Temp: 97.4 °F (36.3 °C)  BP: (!) 145/81  Resp: 22  Heart Rate: 73  SpO2: 96 %  24HR INTAKE/OUTPUT:    Intake/Output Summary (Last 24 hours) at 5/1/2023 0904  Last data filed at 5/1/2023 0648  Gross per 24 hour   Intake 993.74 ml   Output 500 ml   Net 493.74 ml       Weight    Body mass index is 26.76 kg/m². Exam:  GEN:    Awake, alert and oriented x3. EYES:  EOMI, pupils equal   NECK: Supple. No lymphadenopathy. No carotid bruit  CVS:    regular rate and rhythm, no audible murmur  PULM:  CTA, no wheezes, rales or rhonchi, no acute respiratory distress  ABD:    Bowels sounds normal.  Abdomen is soft. No distention. no tenderness to palpation. EXT:   no edema bilaterally . No calf tenderness. NEURO: Moves all extremities. Motor and sensory are grossly intact  SKIN:  No rashes.   No skin lesions.    -----------------------------------------------------------------  Diagnostic Data:     DATA:    CBC:   Lab Results   Component Value Date    WBC 7.4 05/01/2023    RBC 4.89 05/01/2023    HGB 14.2 05/01/2023    HCT 42.5 05/01/2023    MCV 86.9 05/01/2023     05/01/2023        CMP:   Lab Results   Component Value Date    GLUCOSE 89 05/01/2023

## 2023-05-01 NOTE — DISCHARGE SUMMARY
extremities. Motor and sensory are grossly intact  SKIN:    No rashes. No skin lesions    Significant Diagnostic Studies:   Lab Results   Component Value Date    WBC 7.4 05/01/2023    HGB 14.2 05/01/2023     05/01/2023       Lab Results   Component Value Date    BUN 18 05/01/2023    CREATININE 0.89 05/01/2023     05/01/2023    K 3.8 05/01/2023    CALCIUM 9.2 05/01/2023     05/01/2023    CO2 28 05/01/2023    LABGLOM >60 05/01/2023       Lab Results   Component Value Date    WBCUA 2 TO 5 06/21/2021    RBCUA 0 TO 2 06/21/2021    EPITHUA 0 TO 2 06/21/2021    LEUKOCYTESUR TRACE (A) 06/21/2021    SPECGRAV 1.015 06/21/2021    GLUCOSEU NEGATIVE 06/21/2021    KETUA NEGATIVE 06/21/2021    PROTEINU NEGATIVE 06/21/2021    HGBUR NEGATIVE 06/21/2021    CASTUA NOT REPORTED 06/21/2021    CRYSTUA NOT REPORTED 06/21/2021    BACTERIA TRACE (A) 06/21/2021    YEAST NOT REPORTED 06/21/2021       XR CHEST PORTABLE    Result Date: 4/30/2023  EXAMINATION: ONE XRAY VIEW OF THE CHEST 4/30/2023 8:48 am COMPARISON: None. HISTORY: ORDERING SYSTEM PROVIDED HISTORY: chest pain TECHNOLOGIST PROVIDED HISTORY: chest pain FINDINGS: Shallow inflation. The cardiomediastinal silhouette is within normal limits. There is no consolidation, pneumothorax or evidence for edema. No evidence for effusion. No acute osseous abnormality is identified. No acute airspace disease identified.        Assessment and Plan:  Patient Active Problem List    Diagnosis Date Noted    Chest pain in adult 04/30/2023    History of colon polyps 12/08/2021    Gallbladder polyp 08/11/2021    Cholelithiases 08/11/2021    Hypokalemia 06/22/2021    Acute appendicitis 06/21/2021    Hypothyroidism     Hyperlipidemia     Hypertension     Asymptomatic varicose veins         Discharge Medications:         Medication List        CONTINUE taking these medications      Alpha-Lipoic Acid 600 MG Caps     atorvastatin 20 MG tablet  Commonly known as: LIPITOR  TAKE 1

## 2023-05-01 NOTE — PROGRESS NOTES
Writer reviewed discharge instructions with patient. Patient aware of no medication changes. Patient aware of follow up appointment. Writer provided education about chest pain and provided educational handout. Patient denies questions. Copy of discharge instructions given to patient.

## 2023-05-01 NOTE — PLAN OF CARE
Problem: Discharge Planning  Goal: Discharge to home or other facility with appropriate resources  5/1/2023 0842 by Jacquelin Edgar RN  Outcome: Progressing     Problem: Pain  Goal: Verbalizes/displays adequate comfort level or baseline comfort level  5/1/2023 0842 by Jacquelin Edgar RN  Outcome: Progressing     Problem: Safety - Adult  Goal: Free from fall injury  5/1/2023 0842 by Jacquelin Edgar RN  Outcome: Progressing
Problem: Discharge Planning  Goal: Discharge to home or other facility with appropriate resources  5/1/2023 1606 by Fernando Correa RN  Outcome: Completed  5/1/2023 0842 by Naila Carlin RN  Outcome: Progressing     Problem: Pain  Goal: Verbalizes/displays adequate comfort level or baseline comfort level  5/1/2023 1606 by Fernando Correa RN  Outcome: Completed  5/1/2023 0842 by Naila Carlin RN  Outcome: Progressing     Problem: Safety - Adult  Goal: Free from fall injury  5/1/2023 1606 by Fernando Correa RN  Outcome: Completed  5/1/2023 0842 by Naila Carlin RN  Outcome: Progressing
protocol. No falls this shift, care ongoing.

## 2023-05-01 NOTE — PROGRESS NOTES
Pregabalin (Lyrica) order adjusted to patients at home regimen at this time per Dr. Kalia Coulter- see [facility] for details.

## 2023-05-01 NOTE — PROGRESS NOTES
Pt up to bathroom upon entering room. States she only has some chest pain when up moving around. Unable to give numerical value or describe the pain/sensation, just that it's there. VS and assessment as charted. Personal belongings and call light in reach. Waiting to hear when testing is scheduled today. Will continue to monitor.

## 2023-05-02 NOTE — PROCEDURES
[address] [place], [place] [address]                              CARDIAC STRESS TEST    PATIENT NAME: Lissa Jean-Baptiste                :        1950  MED REC NO:   [id number]                              ROOM:       0303  ACCOUNT NO:   [de-identified]                           ADMIT DATE: 2023  PROVIDER:     Carlos Hobson MD    CARDIOVASCULAR DIAGNOSTIC DEPARTMENT    DATE OF STUDY:  2023    ORDERING PROVIDER:  Chhaya Yee. Monserrat Man MD    PRIMARY CARE PROVIDER:  Judy Camacho. Qing Cisneros MD    INTERPRETING PHYSICIAN:  Carlos Hobson MD    EXERCISE MYOCARDIAL PERFUSION STRESS TEST REPORT    Rest/stress single-isotope SPECT imaging with exercise stress and gated  SPECT imaging. INDICATION:  Assessment of recent chest pain and/or discomfort. CLINICAL HISTORY:  The patient is a 82-year-old woman with no known  coronary artery disease. Previous cardiac history:  None. Other previous history includes chest pain, caffeine, hypertension. Symptoms just prior to testing:  None. Relevant medications:  None. PROCEDURE:  The patient performed treadmill exercise using a Sammy  protocol, completing 5:26 minutes and completing an estimated workload  of 7 metabolic equivalents (METS). The test was terminated due to fatigue and shortness of breath. The heart rate was 94 beats per minute at baseline and increased to 131  beats per minute at peak exercise, which was 87% of the maximum  predicted heart rate. The rest blood pressure was 140/76 mmHg and  increased to 170/86 mmHg, which is a normal response. During the  procedure, the patient developed fatigue, shortness of breath, leg  fatigue, and chest pain (1/3), but denied any chest discomfort. Myocardial perfusion imaging: Imaging was performed at rest 30-45  minutes following the injection of 10 mCi of sestamibi.   At peak  exercise, the patient was injected with 30 mCi of

## 2023-05-11 NOTE — ANESTHESIA PRE PROCEDURE
Department of Anesthesiology  Preprocedure Note       Name:  Florentino Brand   Age:  73 y.o.  :  1950                                          MRN:  [id number]         Date:  2021      Surgeon: Nathaniel Jenkins):  Shawnee Copeland MD    Procedure: Procedure(s):  APPENDECTOMY LAPAROSCOPIC    Medications prior to admission:   Prior to Admission medications    Medication Sig Start Date End Date Taking? Authorizing Provider   meloxicam (MOBIC) 7.5 MG tablet TAKE 1 TABLET BY MOUTH DAILY AS NEEDED FOR PAIN ((START AFTER PREDNISONE TAPER IS COMPLETED)) 21  Yes Eleazar Benedict MD   triamterene-hydroCHLOROthiazide (DYAZIDE) 37.5-25 MG per capsule Take 1 capsule by mouth every morning / pt requests 30 day & [facility] 20  Yes SEVERO Wilcox NP   atorvastatin (LIPITOR) 20 MG tablet TAKE 1 TABLET BY MOUTH EVERY DAY 20  Yes Eleazar Benedict MD   levothyroxine (SYNTHROID) 75 MCG tablet TAKE 1 TABLET BY MOUTH EVERY DAY 20  Yes Eleazar Benedict MD   zonisamide (ZONEGRAN) 25 MG capsule Take 25 mg by mouth daily   Yes Historical Provider, MD   pregabalin (LYRICA) 75 MG capsule Take 75 mg by mouth 2 times daily.    Yes Historical Provider, MD   magnesium oxide (MAG-OX) 400 MG tablet Take 400 mg by mouth daily   Yes Historical Provider, MD   Alpha-Lipoic Acid 600 MG CAPS Take by mouth 2 times daily   Yes Historical Provider, MD   Biotin 2500 MCG CAPS Take by mouth 2 times daily   Yes Historical Provider, MD   loratadine (CLARITIN) 10 MG capsule Take 10 mg by mouth daily   Yes Historical Provider, MD   b complex vitamins capsule Take 1 capsule by mouth daily   Yes Historical Provider, MD   Cholecalciferol (VITAMIN D-3 PO) Take by mouth daily   Yes Historical Provider, MD   Multiple Vitamins-Minerals (MULTIVITAMIN & MINERAL PO) Take by mouth   Yes Historical Provider, MD       Current medications:    Current Facility-Administered Medications   Medication Dose Route Frequency Provider Last Rate Last Admin    [MAR Hold] 0.9 % sodium chloride infusion   Intravenous Continuous Panfilo Bui PA-C 75 mL/hr at 06/22/21 0055 New Bag at 06/22/21 0055    [MAR Hold] sodium chloride flush 0.9 % injection 10 mL  10 mL Intravenous 2 times per day Panfilo Bui PA-C        [facility]] sodium chloride flush 0.9 % injection 10 mL  10 mL Intravenous PRN Panfilo Bui PA-C        [facility]] 0.9 % sodium chloride infusion  25 mL Intravenous PRN Panfilo Bui PA-C        [facility]] ondansetron (ZOFRAN-ODT) disintegrating tablet 4 mg  4 mg Oral Q8H PRN Panfilo Bui PA-C        Or    [MAR Hold] ondansetron [facility]) injection 4 mg  4 mg Intravenous Q6H PRN Panfilo Bui PA-C        [MAR Hold] polyethylene glycol (GLYCOLAX) packet 17 g  17 g Oral Daily PRN Panfilo Bui PA-C        [MAR Hold] acetaminophen (TYLENOL) tablet 650 mg  650 mg Oral Q6H PRN Panfilo Bui PA-C   650 mg at 06/22/21 9971    Or    [MAR Hold] acetaminophen (TYLENOL) suppository 650 mg  650 mg Rectal Q6H PRN Panfilo Bui PA-C        [facility]] potassium chloride 10 mEq/100 mL IVPB (Peripheral Line)  10 mEq Intravenous PRN Panfilo Bui PA-C 50 mL/hr at 06/22/21 0502 10 mEq at 06/22/21 0502       Allergies:  No Known Allergies    Problem List:    Patient Active Problem List   Diagnosis Code    Hypothyroidism E03.9    Hyperlipidemia E78.5    Hypertension I10    Asymptomatic varicose veins I83.90    Acute appendicitis K35.80       Past Medical History:        Diagnosis Date    Asymptomatic varicose veins     Hyperlipidemia     Hypertension     Hypothyroidism     Neuropathy     Tremor        Past Surgical History:        Procedure Laterality Date    ACHILLES TENDON SURGERY  2005    rupture repair    COLONOSCOPY  2006    2 benign polyps    COLONOSCOPY  2009    no polyps this time    COLONOSCOPY  9/8/2015    Dr. Milvia Mendez - hemorrhoids, no polyps    THYROID SURGERY  1980    nodule removed        Social History: PREGSERUM, HCG, HCGQUANT     ABGs: No results found for: PHART, PO2ART, IDI5PBD, UJF6LIF, BEART, A4HYCUED     Type & Screen (If Applicable):  No results found for: LABABO, LABRH    Drug/Infectious Status (If Applicable):  No results found for: HIV, HEPCAB    COVID-19 Screening (If Applicable):   Lab Results   Component Value Date    COVID19 Not Detected 06/23/2020           Anesthesia Evaluation  Patient summary reviewed and Nursing notes reviewed  Airway: Mallampati: II  TM distance: <3 FB   Neck ROM: full  Mouth opening: > = 3 FB Dental: normal exam         Pulmonary:Negative Pulmonary ROS and normal exam  breath sounds clear to auscultation            Patient did not smoke on day of surgery. Cardiovascular:  Exercise tolerance: good (>4 METS),   (+) hypertension:, hyperlipidemia      ECG reviewed  Rhythm: regular  Rate: normal           Beta Blocker:  Not on Beta Blocker      ROS comment: Narrative & Impression    Sinus tachycardia with 1st degree A-V block  Left axis deviation  Prolonged QT  Abnormal ECG  No previous ECGs available         Neuro/Psych:   Negative Neuro/Psych ROS              GI/Hepatic/Renal: Neg GI/Hepatic/Renal ROS            Endo/Other:    (+) hypothyroidism::., .                 Abdominal:       Abdomen: tender. Vascular: negative vascular ROS. Anesthesia Plan      regional and general     ASA 3 - emergent       Induction: intravenous. Anesthetic plan and risks discussed with patient. Plan discussed with CRNA.                   SEVERO Jacques - MARLEN   6/22/2021 Calm/Appropriate

## 2023-06-22 ENCOUNTER — HOSPITAL ENCOUNTER (OUTPATIENT)
Age: 73
Discharge: HOME | End: 2023-06-24
Payer: MEDICARE

## 2023-06-22 ENCOUNTER — HOSPITAL ENCOUNTER (OUTPATIENT)
Dept: GENERAL RADIOLOGY | Age: 73
Discharge: HOME | End: 2023-06-24
Payer: MEDICARE

## 2023-06-22 DIAGNOSIS — M25.562 ACUTE PAIN OF LEFT KNEE: ICD-10-CM

## 2023-06-22 PROCEDURE — 73562 X-RAY EXAM OF KNEE 3: CPT

## 2024-04-03 ENCOUNTER — HOSPITAL ENCOUNTER (OUTPATIENT)
Dept: WOMENS IMAGING | Age: 74
Discharge: HOME | End: 2024-04-05
Payer: COMMERCIAL

## 2024-04-03 VITALS — HEIGHT: 69 IN | WEIGHT: 180 LBS | BODY MASS INDEX: 26.66 KG/M2

## 2024-04-03 DIAGNOSIS — Z12.31 VISIT FOR SCREENING MAMMOGRAM: ICD-10-CM

## 2024-04-03 PROCEDURE — 77063 BREAST TOMOSYNTHESIS BI: CPT

## 2025-02-09 ENCOUNTER — APPOINTMENT (OUTPATIENT)
Dept: GENERAL RADIOLOGY | Age: 75
End: 2025-02-09
Payer: MEDICARE

## 2025-02-09 ENCOUNTER — HOSPITAL ENCOUNTER (EMERGENCY)
Age: 75
Discharge: HOME | End: 2025-02-09
Payer: MEDICARE

## 2025-02-09 VITALS
DIASTOLIC BLOOD PRESSURE: 102 MMHG | TEMPERATURE: 97.6 F | SYSTOLIC BLOOD PRESSURE: 155 MMHG | RESPIRATION RATE: 16 BRPM | OXYGEN SATURATION: 97 % | HEART RATE: 66 BPM

## 2025-02-09 DIAGNOSIS — M54.6 ACUTE RIGHT-SIDED THORACIC BACK PAIN: ICD-10-CM
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
## 2025-02-09 PROCEDURE — 71101 X-RAY EXAM UNILAT RIBS/CHEST: CPT

## 2025-02-09 PROCEDURE — 6370000000 HC RX 637 (ALT 250 FOR IP)

## 2025-02-09 PROCEDURE — 99284 EMERGENCY DEPT VISIT MOD MDM: CPT

## 2025-02-09 PROCEDURE — 96372 THER/PROPH/DIAG INJ SC/IM: CPT

## 2025-02-09 PROCEDURE — 6360000002 HC RX W HCPCS

## 2025-02-09 RX ORDER — LIDOCAINE 50 MG/G
1 PATCH TOPICAL DAILY
Qty: 10 PATCH | Refills: 0 | Status: SHIPPED | OUTPATIENT
Start: 2025-02-09 | End: 2025-02-19

## 2025-02-09 RX ORDER — KETOROLAC TROMETHAMINE 30 MG/ML
30 INJECTION, SOLUTION INTRAMUSCULAR; INTRAVENOUS ONCE
Status: COMPLETED | OUTPATIENT
Start: 2025-02-09 | End: 2025-02-09

## 2025-02-09 RX ORDER — CYCLOBENZAPRINE HCL 10 MG
10 TABLET ORAL ONCE
Status: COMPLETED | OUTPATIENT
Start: 2025-02-09 | End: 2025-02-09

## 2025-02-09 RX ORDER — CYCLOBENZAPRINE HCL 5 MG
5 TABLET ORAL 2 TIMES DAILY PRN
Qty: 10 TABLET | Refills: 0 | Status: SHIPPED | OUTPATIENT
Start: 2025-02-09 | End: 2025-02-19

## 2025-02-09 RX ORDER — LIDOCAINE 4 G/G
1 PATCH TOPICAL ONCE
Status: DISCONTINUED | OUTPATIENT
Start: 2025-02-09 | End: 2025-02-09 | Stop reason: HOSPADM

## 2025-02-09 RX ADMIN — KETOROLAC TROMETHAMINE 30 MG: 30 INJECTION, SOLUTION INTRAMUSCULAR at 10:45

## 2025-02-09 RX ADMIN — CYCLOBENZAPRINE HYDROCHLORIDE 10 MG: 10 TABLET, FILM COATED ORAL at 10:45

## 2025-02-09 ASSESSMENT — PAIN SCALES - GENERAL: PAINLEVEL_OUTOF10: 7

## 2025-02-09 ASSESSMENT — PAIN DESCRIPTION - LOCATION
LOCATION: BACK
LOCATION: BACK

## 2025-02-09 ASSESSMENT — PAIN - FUNCTIONAL ASSESSMENT: PAIN_FUNCTIONAL_ASSESSMENT: 0-10

## 2025-02-09 ASSESSMENT — PAIN DESCRIPTION - ORIENTATION
ORIENTATION: LOWER;MID
ORIENTATION: LOWER

## 2025-02-09 NOTE — ED PROVIDER NOTES
Mian.        ------------------------------ ED COURSE/MEDICAL DECISION MAKING----------------------    Medical Decision Making/Differential Diagnosis:    CC/HPI Summary, Pertinent Physical Exam Findings, Social Determinants of health, Records Reviewed, DDx, testing done/not done, ED Course, Reassessment, disposition considerations/shared decision making with patient, consults, disposition:      Medical Decision Making/ED COURSE:    I interpreted findings during patient's stay.   Records reviewed as detailed on the note.    History From: Patient    Limitations to history : None  Social Determinants : None    Chronic Conditions affecting care:    has a past medical history of Asymptomatic varicose veins, Hyperlipidemia, Hypertension, Hypothyroidism, Neuropathy, and Tremor.     Karin Keys is a 74 y.o. female     Vital signs BP (!) 155/102   Pulse 66   Temp 97.6 °F (36.4 °C) (Oral)   Resp 16   SpO2 97%   While in the ED patient was afebrile, nontoxic-appearing, in no respiratory distress.   Physical exam remarkable for Thoracic muscle spasm worse on the right side.  No bruising or deformities noted.  No step off deformities, no midspinal tenderness. Moves all extremities x 4.  Neurovascularly intact. Warm and well perfused, no clubbing or cyanosis. Compartments are soft and compressible. Capillary refill <3 seconds.  Ddx: Working diagnosis includes but is not limited to intracranial hemorrhage, cervical fracture, other fractures, concussion, acute dislocation, musculoskeletal injury, abrasion, contusions or hematomas.    XR RIBS RIGHT INCLUDE CHEST (MIN 3 VIEWS)   Preliminary Result   Mild right basilar atelectasis.  No discrete right rib fracture identified.             Patient administered:  Orders Placed This Encounter   Medications    ketorolac (TORADOL) injection 30 mg    cyclobenzaprine (FLEXERIL) tablet 10 mg    lidocaine 4 % external patch 1 patch    cyclobenzaprine (FLEXERIL) 5 MG tablet     Sig:

## 2025-02-09 NOTE — DISCHARGE INSTRUCTIONS
Please continue taking your Tylenol and Motrin.  You may continue icing the area for the next 24 hours and then apply heat.  Muscle relaxants were given please use as needed and do understand that they have made make you drowsy so do not drive or operate heavy machinery while on muscle relaxant.  Lidocaine patches also sent to your pharmacy.  Return to the ER for any worsening symptoms or concerns.

## 2025-07-16 ENCOUNTER — TRANSCRIBE ORDERS (OUTPATIENT)
Dept: ADMISSION | Age: 75
End: 2025-07-16

## 2025-07-16 ENCOUNTER — HOSPITAL ENCOUNTER (OUTPATIENT)
Age: 75
Discharge: HOME | End: 2025-07-16
Payer: MEDICARE

## 2025-07-16 ENCOUNTER — HOSPITAL ENCOUNTER (OUTPATIENT)
Dept: NON INVASIVE DIAGNOSTICS | Age: 75
Discharge: HOME | End: 2025-07-16
Payer: MEDICARE

## 2025-07-16 DIAGNOSIS — M17.12 OSTEOARTHRITIS OF LEFT KNEE, UNSPECIFIED OSTEOARTHRITIS TYPE: ICD-10-CM

## 2025-07-16 DIAGNOSIS — M17.12 OSTEOARTHRITIS OF LEFT KNEE, UNSPECIFIED OSTEOARTHRITIS TYPE: Primary | ICD-10-CM

## 2025-07-16 LAB
EKG ATRIAL RATE: 73 BPM
EKG P AXIS: 59 DEGREES
EKG P-R INTERVAL: 202 MS
EKG Q-T INTERVAL: 376 MS
EKG QRS DURATION: 74 MS
EKG QTC CALCULATION (BAZETT): 414 MS
EKG R AXIS: -29 DEGREES
EKG T AXIS: 27 DEGREES
EKG VENTRICULAR RATE: 73 BPM

## 2025-07-16 PROCEDURE — 36415 COLL VENOUS BLD VENIPUNCTURE: CPT

## 2025-07-16 PROCEDURE — 93010 ELECTROCARDIOGRAM REPORT: CPT | Performed by: FAMILY MEDICINE

## 2025-07-16 PROCEDURE — 93005 ELECTROCARDIOGRAM TRACING: CPT

## 2025-07-16 PROCEDURE — 87641 MR-STAPH DNA AMP PROBE: CPT

## 2025-07-17 LAB
MRSA, DNA, NASAL: NEGATIVE
SPECIMEN DESCRIPTION: NORMAL

## (undated) DEVICE — INTENDED FOR TISSUE SEPARATION, AND OTHER PROCEDURES THAT REQUIRE A SHARP SURGICAL BLADE TO PUNCTURE OR CUT.: Brand: BARD-PARKER ® STAINLESS STEEL BLADES

## (undated) DEVICE — WARMER SCP 2 ANTIFOG LAP DISP

## (undated) DEVICE — SPONGE GZ W2XL2IN NONWOVEN 4 PLY FASTER WICKING ABIL AVANT

## (undated) DEVICE — BLADELESS OBTURATOR: Brand: WECK VISTA

## (undated) DEVICE — 3M™ PRECISE™ VISTA DISPOSABLE SKIN STAPLER 3995: Brand: 3M™ PRECISE™

## (undated) DEVICE — SOLUTION IV IRRIG POUR BRL 0.9% SODIUM CHL 2F7124

## (undated) DEVICE — Device

## (undated) DEVICE — CANNULA ORAL NSL AD CO2 N INTUB O2 DEL DISP TRU LNK

## (undated) DEVICE — HYPODERMIC SAFETY NEEDLE: Brand: MAGELLAN

## (undated) DEVICE — SOLUTION IV 1000ML 0.9% SOD CHL FOR IRRIG PLAS CONT

## (undated) DEVICE — TROCARS: Brand: KII® BALLOON BLUNT TIP SYSTEM

## (undated) DEVICE — INSUFFLATION NEEDLE TO ESTABLISH PNEUMOPERITONEUM.: Brand: INSUFFLATION NEEDLE

## (undated) DEVICE — DRESSING SURESITE-123PLUSPAD 2.4 IN X2.8 IN

## (undated) DEVICE — BAG SPEC REM 224ML W4XL6IN DIA10MM 1 HND GYN DISP ENDOPCH

## (undated) DEVICE — GLOVE ORANGE PI 7 1/2   MSG9075

## (undated) DEVICE — ENDOPATH ECHELON VASCULAR  RELOADS, WHITE, 35MM: Brand: ECHELON ENDOPATH

## (undated) DEVICE — 40595 XL TRENDELENBURG POSITIONING KIT: Brand: 40595 XL TRENDELENBURG POSITIONING KIT

## (undated) DEVICE — TOTAL TRAY, DB, 100% SILI FOLEY, 16FR 10: Brand: MEDLINE

## (undated) DEVICE — Z INACTIVE USE 2660664 SOLUTION IRRIG 3000ML 0.9% SOD CHL USP UROMATIC PLAS CONT

## (undated) DEVICE — TROCAR: Brand: KII FIOS FIRST ENTRY

## (undated) DEVICE — GLOVE SURG SZ 7 CRM LTX FREE POLYISOPRENE POLYMER BEAD ANTI

## (undated) DEVICE — MEDI-VAC NON-CONDUCTIVE TUBING7MM X 30.5 (100FT): Brand: CARDINAL HEALTH

## (undated) DEVICE — AGENT HEMSTAT W2XL4IN OXIDIZED REGENERATED CELOS ABSRB SFT

## (undated) DEVICE — Z DISCONTINUED USE 2272114 DRAPE SURG UTIL 26X15 IN W/ TAPE N INVASIVE MULTLYR DISP

## (undated) DEVICE — SUTURE SZ 0 27IN 5/8 CIR UR-6  TAPER PT VIOLET ABSRB VICRYL J603H

## (undated) DEVICE — SYRINGE MED 10ML LUERLOCK TIP W/O SFTY DISP

## (undated) DEVICE — 3M™ TEGADERM™ +PAD FILM DRESSING WITH NON-ADHERENT PAD, 3587, 3-1/2 IN X 4-1/8 IN (9 CM X 10.5 CM), 25/CAR, 4 CAR/CS: Brand: 3M™ TEGADERM™

## (undated) DEVICE — PENCIL ES L3M BTTN SWCH HOLSTER W/ BLDE ELECTRD EDGE

## (undated) DEVICE — SUTURE VCRL + SZ 0 L27IN ABSRB VLT L26MM UR-6 5/8 CIR VCP603H

## (undated) DEVICE — CLIP INT L POLYMER LOK LIG HEM O LOK

## (undated) DEVICE — ELECTRODE LAP L36CM PTFE WIRE J HK CLEANCOAT

## (undated) DEVICE — DISSECTOR LAP DIA5MM BLNT TIP ENDOPATH

## (undated) DEVICE — ARM DRAPE

## (undated) DEVICE — CANNULA SEAL

## (undated) DEVICE — PLUMEPORT LAPAROSCOPIC SMOKE FILTRATION DEVICE: Brand: PLUMEPORT ACTIV

## (undated) DEVICE — STAPLER SKIN L320MM 35MM VASC TISS 12 FIRING B FRM PWR

## (undated) DEVICE — SYRINGE, LUER LOCK, 10ML: Brand: MEDLINE

## (undated) DEVICE — STAPLER, SKIN, 35W, A: Brand: MEDLINE INDUSTRIES, INC.